# Patient Record
Sex: MALE | Race: WHITE | NOT HISPANIC OR LATINO | Employment: FULL TIME | URBAN - METROPOLITAN AREA
[De-identification: names, ages, dates, MRNs, and addresses within clinical notes are randomized per-mention and may not be internally consistent; named-entity substitution may affect disease eponyms.]

---

## 2018-07-18 ENCOUNTER — HOSPITAL ENCOUNTER (OUTPATIENT)
Dept: GENERAL RADIOLOGY | Facility: HOSPITAL | Age: 48
Discharge: HOME | End: 2018-07-18
Attending: FAMILY MEDICINE | Admitting: FAMILY MEDICINE

## 2019-06-28 DIAGNOSIS — Z00.00 ENCOUNTER FOR ANNUAL PHYSICAL EXAM: Primary | ICD-10-CM

## 2019-06-29 ENCOUNTER — CONVERSION ENCOUNTER (OUTPATIENT)
Dept: FAMILY MEDICINE CLINIC | Facility: CLINIC | Age: 49
End: 2019-06-29

## 2019-06-29 LAB
ALBUMIN SERPL-MCNC: 4.4 G/DL (ref 3.6–5.1)
ALP SERPL-CCNC: 78 U/L (ref 40–115)
ALT SERPL-CCNC: 27 U/L (ref 9–46)
AST SERPL-CCNC: 20 U/L (ref 10–40)
BASOPHILS # BLD AUTO: 78 CELLS/UL (ref 0–200)
BASOPHILS NFR BLD AUTO: 1.2 %
BILIRUB SERPL-MCNC: 0.8 MG/DL (ref 0.2–1.2)
BUN SERPL-MCNC: 15 MG/DL (ref 7–25)
BUN/CREAT SERPL: ABNORMAL (CALC) (ref 6–22)
CALCIUM SERPL-MCNC: 9.7 MG/DL (ref 8.6–10.3)
CHLORIDE SERPL-SCNC: 104 MMOL/L (ref 98–110)
CONV CO2: 27 MMOL/L (ref 20–32)
CONV TOTAL PROTEIN: 7.1 G/DL (ref 6.1–8.1)
CREAT UR-MCNC: 1.06 MG/DL (ref 0.6–1.35)
EOSINOPHIL # BLD AUTO: 169 CELLS/UL (ref 15–500)
EOSINOPHIL # BLD AUTO: 2.6 %
ERYTHROCYTE [DISTWIDTH] IN BLOOD BY AUTOMATED COUNT: 13.3 % (ref 11–15)
GLOBULIN UR ELPH-MCNC: 2.7 MG/DL (ref 1.9–3.7)
GLUCOSE UR QL: 139 MG/DL (ref 65–99)
HCT VFR BLD AUTO: 45.4 % (ref 38.5–50)
HGB BLD-MCNC: 15.1 G/DL (ref 13.2–17.1)
INSULIN SERPL-ACNC: 1.6 (CALC) (ref 1–2.5)
LYMPHOCYTES # BLD AUTO: 1456 CELLS/UL (ref 850–3900)
LYMPHOCYTES NFR BLD AUTO: 22.4 %
MCH RBC QN AUTO: 28.1 PG (ref 27–33)
MCHC RBC AUTO-ENTMCNC: 33.3 G/DL (ref 32–36)
MCV RBC AUTO: 84.5 FL (ref 80–100)
MONOCYTES # BLD AUTO: 546 CELLS/UL (ref 200–950)
MONOCYTES NFR BLD AUTO: 8.4 %
NEUTROPHILS # BLD AUTO: 4251 CELLS/UL (ref 1500–7800)
NEUTROPHILS NFR BLD AUTO: 65.4 %
PLATELET # BLD AUTO: 245 10*3/UL (ref 140–400)
PMV BLD AUTO: 10 FL (ref 7.5–12.5)
POTASSIUM SERPL-SCNC: 4.5 MMOL/L (ref 3.5–5.3)
RBC # BLD AUTO: 5.37 MILLION/UL (ref 4.2–5.8)
SODIUM SERPL-SCNC: 140 MMOL/L (ref 135–146)
T3RU NFR SERPL: 29 % (ref 22–35)
T4 FREE SERPL-MCNC: 1 NG/DL (ref 0.8–1.8)
TSH SERPL-ACNC: 2.61 MIU/L (ref 0.4–4.5)
WBC # BLD AUTO: 6.5 10*3/UL (ref 3.8–10.8)

## 2019-07-09 ENCOUNTER — OFFICE VISIT (OUTPATIENT)
Dept: FAMILY MEDICINE CLINIC | Facility: CLINIC | Age: 49
End: 2019-07-09

## 2019-07-09 VITALS
TEMPERATURE: 98.2 F | DIASTOLIC BLOOD PRESSURE: 76 MMHG | SYSTOLIC BLOOD PRESSURE: 128 MMHG | HEIGHT: 67 IN | RESPIRATION RATE: 16 BRPM | WEIGHT: 241.8 LBS | OXYGEN SATURATION: 97 % | BODY MASS INDEX: 37.95 KG/M2 | HEART RATE: 92 BPM

## 2019-07-09 DIAGNOSIS — E78.2 MIXED HYPERLIPIDEMIA: ICD-10-CM

## 2019-07-09 DIAGNOSIS — I10 ESSENTIAL HYPERTENSION: ICD-10-CM

## 2019-07-09 DIAGNOSIS — Z12.5 SCREENING PSA (PROSTATE SPECIFIC ANTIGEN): ICD-10-CM

## 2019-07-09 DIAGNOSIS — R22.1 MASS IN NECK: Primary | ICD-10-CM

## 2019-07-09 PROBLEM — M10.072 ACUTE IDIOPATHIC GOUT OF LEFT ANKLE: Status: ACTIVE | Noted: 2019-07-09

## 2019-07-09 PROBLEM — IMO0001 WHITE COAT HYPERTENSION: Status: ACTIVE | Noted: 2019-07-09

## 2019-07-09 PROBLEM — M13.0 POLYARTHROPATHY OR POLYARTHRITIS: Status: ACTIVE | Noted: 2019-07-09

## 2019-07-09 PROBLEM — J30.9 ALLERGIC RHINITIS: Status: ACTIVE | Noted: 2019-07-09

## 2019-07-09 PROBLEM — M51.24 THORACIC DISC HERNIATION: Status: ACTIVE | Noted: 2019-07-09

## 2019-07-09 PROBLEM — M53.3 SI (SACROILIAC) JOINT DYSFUNCTION: Status: ACTIVE | Noted: 2019-07-09

## 2019-07-09 PROCEDURE — 99214 OFFICE O/P EST MOD 30 MIN: CPT | Performed by: FAMILY MEDICINE

## 2019-07-09 RX ORDER — IBUPROFEN 200 MG
TABLET ORAL AS NEEDED
COMMUNITY
End: 2020-02-06

## 2019-07-09 RX ORDER — COLCHICINE 0.6 MG/1
0.6 TABLET ORAL AS NEEDED
COMMUNITY
Start: 2017-12-15

## 2019-07-09 NOTE — PROGRESS NOTES
Rooming Tab(CC,VS,Pt Hx,Fall Screen)  Chief Complaint   Patient presents with   • Cyst   • Plantar Fasciitis       Subjective   Bob Rebollar is a 48 y.o. male.     Cyst   This is a new problem. The current episode started more than 1 month ago. The problem occurs constantly. The problem has been gradually worsening. Associated symptoms include fatigue. Pertinent negatives include no abdominal pain, arthralgias, chest pain, coughing, fever, nausea, rash, sore throat, swollen glands or vomiting. Nothing aggravates the symptoms. He has tried nothing for the symptoms.   Hypertension   This is a chronic problem. The current episode started more than 1 year ago. The problem is unchanged. The problem is controlled. Pertinent negatives include no chest pain or shortness of breath. There are no associated agents to hypertension. There are no known risk factors for coronary artery disease. Past treatments include nothing. Current antihypertension treatment includes nothing. There is no history of angina, kidney disease, CAD/MI, CVA, heart failure or left ventricular hypertrophy. There is no history of chronic renal disease.        The following portions of the patient's history were reviewed and updated as appropriate: allergies, current medications, past family history, past medical history, past social history, past surgical history and problem list.    Patient Care Team:  Anisha Carmona MD as PCP - General    Problem List Tab  Medications Tab  Synopsis Tab  Chart Review Tab  Care Everywhere Tab  Immunizations Tab  Patient History Tab    Social History     Tobacco Use   • Smoking status: Former Smoker     Last attempt to quit:      Years since quittin.5   Substance Use Topics   • Alcohol use: No     Frequency: Never       Review of Systems   Constitutional: Positive for fatigue. Negative for activity change and fever.   HENT: Negative for ear pain, rhinorrhea, sinus pressure, sore throat and voice change.   "  Eyes: Negative for visual disturbance.   Respiratory: Negative for cough and shortness of breath.    Cardiovascular: Negative for chest pain.   Gastrointestinal: Negative for abdominal pain, diarrhea, nausea and vomiting.   Endocrine: Negative for cold intolerance and heat intolerance.   Genitourinary: Negative for frequency and urgency.   Musculoskeletal: Negative for arthralgias.   Skin: Negative for rash.   Neurological: Negative for syncope.   Hematological: Does not bruise/bleed easily.   Psychiatric/Behavioral: Negative for depressed mood. The patient is not nervous/anxious.        Objective     Rooming Tab(CC,VS,Pt Hx,Fall Screen)  /76   Pulse 92   Temp 98.2 °F (36.8 °C)   Resp 16   Ht 170.2 cm (67\")   Wt 110 kg (241 lb 12.8 oz)   SpO2 97%   BMI 37.87 kg/m²     Body mass index is 37.87 kg/m².    Physical Exam   Constitutional: He is oriented to person, place, and time. He appears well-developed and well-nourished. He is cooperative.   Cardiovascular: Normal rate, regular rhythm and normal heart sounds. Exam reveals no gallop and no friction rub.   No murmur heard.  Pulmonary/Chest: Effort normal and breath sounds normal. He has no wheezes. He has no rales.   Neurological: He is alert and oriented to person, place, and time. Coordination normal.   Skin: Skin is warm and dry.   Psychiatric: He has a normal mood and affect.   Vitals reviewed.       Statin Choice Calculator  Data Reviewed:               Lab Results   Component Value Date    CHLPL 214 (H) 07/09/2019    TRIG 245 (H) 07/09/2019    HDL 44 07/09/2019    VLDL 49 (H) 07/09/2019     (H) 07/09/2019    PSA 0.9 07/09/2019      Assessment/Plan   Order Review Tab  Health Maintenance Tab  Patient Plan/Order Tab    Problem List Items Addressed This Visit        Cardiovascular and Mediastinum    Essential hypertension    Overview     White coat HTN type           Other Visit Diagnoses     Mass in neck    -  Primary    send to ENT    " Relevant Orders    CT Soft Tissue Neck With & Without Contrast    Mixed hyperlipidemia        Relevant Orders    LP+Non-HDL Cholesterol ([facility]) (Completed)    Screening PSA (prostate specific antigen)        Relevant Orders    PSA Screen (Completed)          Wrapup Tab  No Follow-up on file.

## 2019-07-10 LAB
CHOLEST SERPL-MCNC: 214 MG/DL (ref 100–199)
HDLC SERPL-MCNC: 44 MG/DL
LDLC SERPL CALC-MCNC: 121 MG/DL (ref 0–99)
NONHDLC SERPL-MCNC: 170 MG/DL (ref 0–129)
PSA SERPL-MCNC: 0.9 NG/ML (ref 0–4)
TRIGL SERPL-MCNC: 245 MG/DL (ref 0–149)
VLDLC SERPL CALC-MCNC: 49 MG/DL (ref 5–40)

## 2019-07-11 PROBLEM — I10 ESSENTIAL HYPERTENSION: Status: ACTIVE | Noted: 2019-07-09

## 2019-07-16 DIAGNOSIS — E78.2 MIXED HYPERLIPIDEMIA: Primary | ICD-10-CM

## 2019-07-16 RX ORDER — ROSUVASTATIN CALCIUM 10 MG/1
10 TABLET, COATED ORAL DAILY
Qty: 90 TABLET | Refills: 6 | Status: SHIPPED | OUTPATIENT
Start: 2019-07-16 | End: 2020-02-06

## 2019-07-24 ENCOUNTER — HOSPITAL ENCOUNTER (OUTPATIENT)
Dept: CT IMAGING | Facility: HOSPITAL | Age: 49
Discharge: HOME | End: 2019-07-24
Admitting: FAMILY MEDICINE

## 2019-07-24 DIAGNOSIS — R22.1 MASS IN NECK: ICD-10-CM

## 2019-07-24 PROCEDURE — 0 IOPAMIDOL PER 1 ML: Performed by: FAMILY MEDICINE

## 2019-07-24 PROCEDURE — 70492 CT SFT TSUE NCK W/O & W/DYE: CPT

## 2019-07-24 RX ADMIN — IOPAMIDOL 100 ML: 755 INJECTION, SOLUTION INTRAVENOUS at 16:30

## 2019-07-26 ENCOUNTER — TELEPHONE (OUTPATIENT)
Dept: FAMILY MEDICINE CLINIC | Facility: CLINIC | Age: 49
End: 2019-07-26

## 2019-08-05 ENCOUNTER — TELEPHONE (OUTPATIENT)
Dept: FAMILY MEDICINE CLINIC | Facility: CLINIC | Age: 49
End: 2019-08-05

## 2019-08-05 NOTE — TELEPHONE ENCOUNTER
----- Message from Anisha Carmona MD sent at 7/25/2019  8:26 AM EDT -----  His ct is ok except for a little calcium near the parotid gland. I believe that means inflammation of the gland. He can see ENT to get this looked at to make sure it is nothing else

## 2020-01-17 ENCOUNTER — OFFICE VISIT (OUTPATIENT)
Dept: FAMILY MEDICINE CLINIC | Facility: CLINIC | Age: 50
End: 2020-01-17

## 2020-01-17 VITALS
SYSTOLIC BLOOD PRESSURE: 136 MMHG | BODY MASS INDEX: 38.14 KG/M2 | DIASTOLIC BLOOD PRESSURE: 82 MMHG | RESPIRATION RATE: 18 BRPM | WEIGHT: 243 LBS | TEMPERATURE: 98.4 F | HEART RATE: 108 BPM | OXYGEN SATURATION: 96 % | HEIGHT: 67 IN

## 2020-01-17 DIAGNOSIS — J01.00 ACUTE NON-RECURRENT MAXILLARY SINUSITIS: Primary | ICD-10-CM

## 2020-01-17 PROCEDURE — 99213 OFFICE O/P EST LOW 20 MIN: CPT | Performed by: FAMILY MEDICINE

## 2020-01-17 RX ORDER — CEFPROZIL 500 MG/1
500 TABLET, FILM COATED ORAL 2 TIMES DAILY
Qty: 28 TABLET | Refills: 1 | Status: SHIPPED | OUTPATIENT
Start: 2020-01-17 | End: 2020-02-06

## 2020-01-17 NOTE — PROGRESS NOTES
Subjective   Bob Rebollar is a 49 y.o. male.     Sx ongoing x 1 month    Cough   Episode onset: 1 months. The problem has been waxing and waning. The problem occurs every few minutes. The cough is productive of sputum. Associated symptoms include nasal congestion, postnasal drip, a sore throat (scratchy) and wheezing. Pertinent negatives include no chest pain, ear pain, fever, rash or shortness of breath. Associated symptoms comments: Sinus pressure.        The following portions of the patient's history were reviewed and updated as appropriate: allergies, current medications, past family history, past medical history, past social history, past surgical history and problem list.    Patient Active Problem List   Diagnosis   • Acute idiopathic gout of left ankle   • Allergic rhinitis   • History of tobacco use   • Polyarthropathy or polyarthritis   • Thoracic disc herniation   • Essential hypertension   • SI (sacroiliac) joint dysfunction       Current Outpatient Medications on File Prior to Visit   Medication Sig Dispense Refill   • colchicine (COLCRYS) 0.6 MG tablet Take  by mouth As Needed.     • ibuprofen (ADVIL,MOTRIN) 200 MG tablet Take  by mouth As Needed.     • Menthol-Methyl Salicylate (BENGAY ARTHRITIS FORMULA EX) As Needed.     • rosuvastatin (CRESTOR) 10 MG tablet Take 1 tablet by mouth Daily. 90 tablet 6   • [DISCONTINUED] Carboxymeth-Glycerin-Polysorb (REFRESH OPTIVE ADVANCED) 0.5-1-0.5 % solution Apply  to eye(s) as directed by provider As Needed.     • [DISCONTINUED] Carboxymethylcellulose Sodium (CELLUVISC OP) Apply  to eye(s) as directed by provider Daily.       No current facility-administered medications on file prior to visit.        No Known Allergies    Review of Systems   Constitutional: Negative for activity change, appetite change, fatigue and fever.   HENT: Positive for postnasal drip and sore throat (scratchy). Negative for ear pain, swollen glands and voice change.    Eyes: Negative for  visual disturbance.   Respiratory: Positive for cough and wheezing. Negative for shortness of breath.    Cardiovascular: Negative for chest pain and leg swelling.   Gastrointestinal: Negative for abdominal pain, blood in stool, constipation, diarrhea, nausea and vomiting.   Endocrine: Negative for polydipsia and polyuria.   Genitourinary: Negative for dysuria, frequency and hematuria.   Musculoskeletal: Negative for joint swelling, neck pain and neck stiffness.   Skin: Negative for rash and bruise.   Neurological: Negative for weakness, numbness and headache.   Psychiatric/Behavioral: Negative for suicidal ideas and depressed mood.     I have reviewed and confirmed the accuracy of the ROS as documented by the MA/LPN/RN Dillan Mitchell MD      Objective   Vitals:    01/17/20 1615   BP: 136/82   Pulse: 108   Resp: 18   Temp: 98.4 °F (36.9 °C)   SpO2: 96%     Physical Exam   Constitutional: He is oriented to person, place, and time. He appears well-developed and well-nourished.   Eyes: Pupils are equal, round, and reactive to light. Conjunctivae and EOM are normal.   Neck: Normal range of motion. Neck supple.   Cardiovascular: Normal rate, regular rhythm and normal heart sounds.   Pulmonary/Chest: Effort normal and breath sounds normal.   Abdominal: Soft. Bowel sounds are normal.   Musculoskeletal: Normal range of motion.   Neurological: He is alert and oriented to person, place, and time.   Skin: Skin is warm and dry.   Psychiatric: He has a normal mood and affect. His behavior is normal. Judgment and thought content normal.         Assessment/Plan .  Problem List Items Addressed This Visit     None      Visit Diagnoses     Acute non-recurrent maxillary sinusitis    -  Primary    Relevant Medications    cefprozil (CEFZIL) 500 MG tablet      OTC meds discussed. Call next week if sx persist. May need steroid pack if sx persist.  Follow-up in 2 weeks if not better.  Follow-up sooner for worsening symptoms or for  any concerns.    Medication and medication adverse effects discussed.  Drug education given and explained to patient. Patient verbalized understanding.

## 2020-01-24 ENCOUNTER — TELEPHONE (OUTPATIENT)
Dept: FAMILY MEDICINE CLINIC | Facility: CLINIC | Age: 50
End: 2020-01-24

## 2020-01-24 DIAGNOSIS — J01.00 ACUTE NON-RECURRENT MAXILLARY SINUSITIS: Primary | ICD-10-CM

## 2020-01-24 RX ORDER — METHYLPREDNISOLONE 4 MG/1
TABLET ORAL
Qty: 21 TABLET | Refills: 0 | Status: SHIPPED | OUTPATIENT
Start: 2020-01-24 | End: 2020-02-06

## 2020-01-24 RX ORDER — ALBUTEROL SULFATE 90 UG/1
2 AEROSOL, METERED RESPIRATORY (INHALATION) EVERY 4 HOURS PRN
Qty: 1 INHALER | Refills: 0 | Status: SHIPPED | OUTPATIENT
Start: 2020-01-24 | End: 2023-03-29 | Stop reason: SDUPTHER

## 2020-01-24 NOTE — TELEPHONE ENCOUNTER
Pt called.  Was seen on 1-17 and given cefzil. Was ayden d to call back if he didn't get better.  He is not any better.  Still a lot of coughing and wheezing.  He is requesting an inhaker and steroid.

## 2020-02-04 ENCOUNTER — TELEPHONE (OUTPATIENT)
Dept: FAMILY MEDICINE CLINIC | Facility: CLINIC | Age: 50
End: 2020-02-04

## 2020-02-06 ENCOUNTER — OFFICE VISIT (OUTPATIENT)
Dept: FAMILY MEDICINE CLINIC | Facility: CLINIC | Age: 50
End: 2020-02-06

## 2020-02-06 VITALS
RESPIRATION RATE: 18 BRPM | OXYGEN SATURATION: 98 % | DIASTOLIC BLOOD PRESSURE: 92 MMHG | BODY MASS INDEX: 37.73 KG/M2 | HEIGHT: 67 IN | HEART RATE: 105 BPM | WEIGHT: 240.4 LBS | SYSTOLIC BLOOD PRESSURE: 146 MMHG | TEMPERATURE: 98.2 F

## 2020-02-06 DIAGNOSIS — M94.0 COSTOCHONDRITIS: ICD-10-CM

## 2020-02-06 DIAGNOSIS — J45.41 MODERATE PERSISTENT REACTIVE AIRWAY DISEASE WITH ACUTE EXACERBATION: Primary | ICD-10-CM

## 2020-02-06 DIAGNOSIS — K21.9 GASTROESOPHAGEAL REFLUX DISEASE WITHOUT ESOPHAGITIS: ICD-10-CM

## 2020-02-06 PROCEDURE — 99214 OFFICE O/P EST MOD 30 MIN: CPT | Performed by: FAMILY MEDICINE

## 2020-02-06 RX ORDER — BUDESONIDE AND FORMOTEROL FUMARATE DIHYDRATE 160; 4.5 UG/1; UG/1
2 AEROSOL RESPIRATORY (INHALATION)
Qty: 1 INHALER | Refills: 0 | Status: SHIPPED | OUTPATIENT
Start: 2020-02-06 | End: 2021-10-04

## 2020-02-06 RX ORDER — MONTELUKAST SODIUM 10 MG/1
10 TABLET ORAL NIGHTLY
Qty: 30 TABLET | Refills: 12 | Status: SHIPPED | OUTPATIENT
Start: 2020-02-06 | End: 2021-10-04

## 2020-02-06 RX ORDER — CYCLOBENZAPRINE HCL 10 MG
1 TABLET ORAL EVERY 8 HOURS PRN
COMMUNITY
Start: 2020-02-04 | End: 2021-04-13

## 2020-02-06 RX ORDER — ALBUTEROL SULFATE 2.5 MG/3ML
2.5 SOLUTION RESPIRATORY (INHALATION) EVERY 4 HOURS PRN
Qty: 90 VIAL | Refills: 0 | Status: SHIPPED | OUTPATIENT
Start: 2020-02-06

## 2020-06-12 ENCOUNTER — TELEPHONE (OUTPATIENT)
Dept: FAMILY MEDICINE CLINIC | Facility: CLINIC | Age: 50
End: 2020-06-12

## 2020-07-06 ENCOUNTER — OFFICE VISIT (OUTPATIENT)
Dept: FAMILY MEDICINE CLINIC | Facility: CLINIC | Age: 50
End: 2020-07-06

## 2020-07-06 VITALS
BODY MASS INDEX: 37.2 KG/M2 | WEIGHT: 237 LBS | TEMPERATURE: 97.9 F | SYSTOLIC BLOOD PRESSURE: 142 MMHG | HEART RATE: 88 BPM | HEIGHT: 67 IN | RESPIRATION RATE: 16 BRPM | DIASTOLIC BLOOD PRESSURE: 84 MMHG | OXYGEN SATURATION: 99 %

## 2020-07-06 DIAGNOSIS — T14.8XXA ABRASION: ICD-10-CM

## 2020-07-06 DIAGNOSIS — Z23 NEED FOR TDAP VACCINATION: Primary | ICD-10-CM

## 2020-07-06 PROCEDURE — 90471 IMMUNIZATION ADMIN: CPT | Performed by: FAMILY MEDICINE

## 2020-07-06 PROCEDURE — 90715 TDAP VACCINE 7 YRS/> IM: CPT | Performed by: FAMILY MEDICINE

## 2020-07-06 PROCEDURE — 99213 OFFICE O/P EST LOW 20 MIN: CPT | Performed by: FAMILY MEDICINE

## 2020-07-06 RX ORDER — ROSUVASTATIN CALCIUM 10 MG/1
10 TABLET, COATED ORAL DAILY
COMMUNITY
Start: 2020-05-21 | End: 2021-05-26

## 2020-07-06 NOTE — PROGRESS NOTES
Subjective   Bob Rebollar is a 49 y.o. male.     Chief Complaint   Patient presents with   • Wound Check       Wound Check   He was originally treated more than 14 days ago. His temperature was unmeasured prior to arrival. The temperature was taken using an oral thermometer. There has been no drainage from the wound. There is no redness present. There is no swelling present. There is no pain present.        The following portions of the patient's history were reviewed and updated as appropriate: allergies, current medications, past family history, past medical history, past social history, past surgical history and problem list.    Allergies:  No Known Allergies    Social History:  Social History     Socioeconomic History   • Marital status:      Spouse name: Not on file   • Number of children: Not on file   • Years of education: Not on file   • Highest education level: Not on file   Tobacco Use   • Smoking status: Former Smoker     Packs/day: 1.00     Years: 8.00     Pack years: 8.00     Types: Cigarettes     Last attempt to quit:      Years since quittin.5   • Smokeless tobacco: Never Used   Substance and Sexual Activity   • Alcohol use: No     Frequency: Never   • Drug use: Never   • Sexual activity: Defer       Family History:  Family History   Problem Relation Age of Onset   • Diabetes Mother         diabetes mellitus   • Heart disease Mother         cardiovascular disease, ischemic   • Diabetes Brother         diabetes mellitus       Past Medical History :  Patient Active Problem List   Diagnosis   • Acute idiopathic gout of left ankle   • Allergic rhinitis   • History of tobacco use   • Polyarthropathy or polyarthritis   • Thoracic disc herniation   • Essential hypertension   • SI (sacroiliac) joint dysfunction   • Gastroesophageal reflux disease without esophagitis   • Abrasion       Medication List:    Current Outpatient Medications:   •  albuterol (PROVENTIL) (2.5 MG/3ML) 0.083% nebulizer  solution, Take 2.5 mg by nebulization Every 4 (Four) Hours As Needed for Wheezing., Disp: 90 vial, Rfl: 0  •  albuterol sulfate  (90 Base) MCG/ACT inhaler, Inhale 2 puffs Every 4 (Four) Hours As Needed for Wheezing., Disp: 1 inhaler, Rfl: 0  •  colchicine (COLCRYS) 0.6 MG tablet, Take 0.6 mg by mouth As Needed., Disp: , Rfl:   •  cyclobenzaprine (FLEXERIL) 10 MG tablet, Take 1 tablet by mouth Every 8 (Eight) Hours As Needed., Disp: , Rfl:   •  diclofenac (VOLTAREN) 50 MG EC tablet, Take 1 tablet by mouth 2 (Two) Times a Day., Disp: 60 tablet, Rfl: 12  •  ipratropium (ATROVENT) 0.02 % nebulizer solution, Take 2.5 mL by nebulization Every 8 (Eight) Hours., Disp: 100 mL, Rfl: 12  •  Menthol-Methyl Salicylate (BENGAY ARTHRITIS FORMULA EX), As Needed., Disp: , Rfl:   •  montelukast (SINGULAIR) 10 MG tablet, Take 1 tablet by mouth Every Night., Disp: 30 tablet, Rfl: 12  •  budesonide-formoterol (SYMBICORT) 160-4.5 MCG/ACT inhaler, Inhale 2 puffs 2 (Two) Times a Day., Disp: 1 inhaler, Rfl: 0  •  rosuvastatin (CRESTOR) 10 MG tablet, Take 10 mg by mouth Daily., Disp: , Rfl:     Past Surgical History:  History reviewed. No pertinent surgical history.    Review of Systems:  Review of Systems   Constitutional: Negative for activity change and fever.   HENT: Negative for ear pain, rhinorrhea, sinus pressure and voice change.    Eyes: Negative for visual disturbance.   Respiratory: Negative for cough and shortness of breath.    Cardiovascular: Negative for chest pain.   Gastrointestinal: Negative for abdominal pain, diarrhea, nausea and vomiting.   Endocrine: Negative for cold intolerance and heat intolerance.   Genitourinary: Negative for frequency and urgency.   Musculoskeletal: Negative for arthralgias.   Skin: Negative for rash.   Neurological: Negative for syncope.   Hematological: Does not bruise/bleed easily.   Psychiatric/Behavioral: Negative for depressed mood. The patient is not nervous/anxious.        Physical  "Exam:  Vital Signs:  Visit Vitals  /84 (BP Location: Left arm)   Pulse 88   Temp 97.9 °F (36.6 °C) (Oral)   Resp 16   Ht 170.2 cm (67\")   Wt 108 kg (237 lb)   SpO2 99%   BMI 37.12 kg/m²       Physical Exam   Constitutional: He is oriented to person, place, and time. He appears well-developed and well-nourished. He is cooperative.   Cardiovascular: Normal rate, regular rhythm and normal heart sounds. Exam reveals no gallop and no friction rub.   No murmur heard.  Pulmonary/Chest: Effort normal and breath sounds normal. He has no wheezes. He has no rales.   Neurological: He is alert and oriented to person, place, and time. Coordination normal.   Skin: Skin is warm and dry.   Left wrist with abrasion, healing, no erythema, tenderness or drainage   Psychiatric: He has a normal mood and affect.   Vitals reviewed.      Assessment and Plan:  Problem List Items Addressed This Visit        Other    Abrasion    Overview     Left wrist  Looks like it is healing  TDAP given today           Other Visit Diagnoses     Need for Tdap vaccination    -  Primary    Relevant Orders    Tdap Vaccine Greater Than or Equal To 8yo IM (Completed)          An After Visit Summary and PPPS were given to the patient.         I wore protective equipment throughout this patient encounter to include mask and gloves. Hand hygiene was performed before donning protective equipment and after removal when leaving the room.   "

## 2020-10-15 ENCOUNTER — OFFICE VISIT (OUTPATIENT)
Dept: FAMILY MEDICINE CLINIC | Facility: CLINIC | Age: 50
End: 2020-10-15

## 2020-10-15 VITALS
BODY MASS INDEX: 35.79 KG/M2 | OXYGEN SATURATION: 93 % | RESPIRATION RATE: 18 BRPM | TEMPERATURE: 98.5 F | SYSTOLIC BLOOD PRESSURE: 148 MMHG | HEIGHT: 67 IN | HEART RATE: 90 BPM | WEIGHT: 228 LBS | DIASTOLIC BLOOD PRESSURE: 105 MMHG

## 2020-10-15 DIAGNOSIS — I10 ESSENTIAL HYPERTENSION: Primary | ICD-10-CM

## 2020-10-15 DIAGNOSIS — H65.01 NON-RECURRENT ACUTE SEROUS OTITIS MEDIA OF RIGHT EAR: ICD-10-CM

## 2020-10-15 DIAGNOSIS — J01.00 ACUTE NON-RECURRENT MAXILLARY SINUSITIS: ICD-10-CM

## 2020-10-15 PROCEDURE — 99214 OFFICE O/P EST MOD 30 MIN: CPT | Performed by: FAMILY MEDICINE

## 2020-10-15 RX ORDER — CEPHALEXIN 500 MG/1
500 CAPSULE ORAL 3 TIMES DAILY
Qty: 30 CAPSULE | Refills: 0 | Status: SHIPPED | OUTPATIENT
Start: 2020-10-15 | End: 2021-04-13

## 2020-10-15 RX ORDER — LISINOPRIL 10 MG/1
10 TABLET ORAL DAILY
Qty: 30 TABLET | Refills: 12 | Status: SHIPPED | OUTPATIENT
Start: 2020-10-15 | End: 2021-04-13

## 2020-10-15 NOTE — PROGRESS NOTES
Subjective   Bob Rebollar is a 50 y.o. male.     Chief Complaint   Patient presents with   • Cough   • URI       URI   This is a new problem. The current episode started in the past 7 days. The problem has been gradually worsening. The maximum temperature recorded prior to his arrival was 100.4 - 100.9 F. The fever has been present for 1 to 2 days. Associated symptoms include congestion, coughing, headaches, a plugged ear sensation and sinus pain. Pertinent negatives include no abdominal pain, chest pain, diarrhea, ear pain, nausea, rash, rhinorrhea, sneezing, sore throat, swollen glands, vomiting or wheezing. Associated symptoms comments: Body aches  . Treatments tried: ibuprofen, antibiotic flexeril, muscle relaxers tiazadan. alkaselterz. The treatment provided no relief.        The following portions of the patient's history were reviewed and updated as appropriate: allergies, current medications, past family history, past medical history, past social history, past surgical history and problem list.    Allergies:  No Known Allergies    Social History:  Social History     Socioeconomic History   • Marital status:      Spouse name: Not on file   • Number of children: Not on file   • Years of education: Not on file   • Highest education level: Not on file   Tobacco Use   • Smoking status: Former Smoker     Packs/day: 1.00     Years: 8.00     Pack years: 8.00     Types: Cigarettes     Quit date:      Years since quittin.8   • Smokeless tobacco: Never Used   Substance and Sexual Activity   • Alcohol use: No     Frequency: Never   • Drug use: Never   • Sexual activity: Defer       Family History:  Family History   Problem Relation Age of Onset   • Diabetes Mother         diabetes mellitus   • Heart disease Mother         cardiovascular disease, ischemic   • Diabetes Brother         diabetes mellitus       Past Medical History :  Patient Active Problem List   Diagnosis   • Acute idiopathic gout of left  ankle   • Allergic rhinitis   • History of tobacco use   • Polyarthropathy or polyarthritis   • Thoracic disc herniation   • Essential hypertension   • SI (sacroiliac) joint dysfunction   • Gastroesophageal reflux disease without esophagitis   • Abrasion   • Non-recurrent acute serous otitis media of right ear   • Acute non-recurrent maxillary sinusitis       Medication List:    Current Outpatient Medications:   •  albuterol (PROVENTIL) (2.5 MG/3ML) 0.083% nebulizer solution, Take 2.5 mg by nebulization Every 4 (Four) Hours As Needed for Wheezing., Disp: 90 vial, Rfl: 0  •  albuterol sulfate  (90 Base) MCG/ACT inhaler, Inhale 2 puffs Every 4 (Four) Hours As Needed for Wheezing., Disp: 1 inhaler, Rfl: 0  •  budesonide-formoterol (SYMBICORT) 160-4.5 MCG/ACT inhaler, Inhale 2 puffs 2 (Two) Times a Day., Disp: 1 inhaler, Rfl: 0  •  colchicine (COLCRYS) 0.6 MG tablet, Take 0.6 mg by mouth As Needed., Disp: , Rfl:   •  cyclobenzaprine (FLEXERIL) 10 MG tablet, Take 1 tablet by mouth Every 8 (Eight) Hours As Needed., Disp: , Rfl:   •  ipratropium (ATROVENT) 0.02 % nebulizer solution, Take 2.5 mL by nebulization Every 8 (Eight) Hours., Disp: 100 mL, Rfl: 12  •  Menthol-Methyl Salicylate (BENGAY ARTHRITIS FORMULA EX), As Needed., Disp: , Rfl:   •  montelukast (SINGULAIR) 10 MG tablet, Take 1 tablet by mouth Every Night., Disp: 30 tablet, Rfl: 12  •  rosuvastatin (CRESTOR) 10 MG tablet, Take 10 mg by mouth Daily., Disp: , Rfl:   •  cephalexin (KEFLEX) 500 MG capsule, Take 1 capsule by mouth 3 (Three) Times a Day., Disp: 30 capsule, Rfl: 0  •  diclofenac (VOLTAREN) 50 MG EC tablet, Take 1 tablet by mouth 2 (Two) Times a Day., Disp: 60 tablet, Rfl: 12  •  lisinopril (PRINIVIL,ZESTRIL) 10 MG tablet, Take 1 tablet by mouth Daily., Disp: 30 tablet, Rfl: 12    Past Surgical History:  No past surgical history on file.    Review of Systems:  Review of Systems   Constitutional: Negative for activity change, chills and fever.  "  HENT: Positive for congestion. Negative for ear pain, postnasal drip, rhinorrhea, sinus pressure, sneezing, sore throat, swollen glands, trouble swallowing and voice change.    Eyes: Negative for pain, redness, itching and visual disturbance.   Respiratory: Positive for cough. Negative for shortness of breath and wheezing.    Cardiovascular: Negative for chest pain.   Gastrointestinal: Negative for abdominal pain, diarrhea, nausea and vomiting.   Endocrine: Negative for cold intolerance and heat intolerance.   Genitourinary: Negative for frequency and urgency.   Musculoskeletal: Negative for arthralgias.   Skin: Negative for rash.   Neurological: Negative for syncope.   Hematological: Does not bruise/bleed easily.   Psychiatric/Behavioral: Negative for depressed mood. The patient is not nervous/anxious.        Physical Exam:  Vital Signs:  Visit Vitals  BP (!) 148/105   Pulse 90   Temp 98.5 °F (36.9 °C)   Resp 18   Ht 170.2 cm (67\")   Wt 103 kg (228 lb)   SpO2 93%   BMI 35.71 kg/m²       Physical Exam  Vitals signs reviewed.   Constitutional:       Appearance: Normal appearance. He is well-developed.   HENT:      Head: Normocephalic and atraumatic.      Right Ear: External ear normal. No middle ear effusion. Tympanic membrane is not injected, erythematous, retracted or bulging.      Left Ear: External ear normal.  No middle ear effusion. Tympanic membrane is not injected, erythematous, retracted or bulging.      Nose: Nose normal. No rhinorrhea.      Mouth/Throat:      Pharynx: No oropharyngeal exudate.   Cardiovascular:      Rate and Rhythm: Normal rate and regular rhythm.      Heart sounds: Normal heart sounds. No murmur. No friction rub. No gallop.    Pulmonary:      Effort: Pulmonary effort is normal. No respiratory distress.      Breath sounds: Normal breath sounds. No wheezing or rales.   Lymphadenopathy:      Cervical: No cervical adenopathy.   Skin:     General: Skin is warm and dry.   Neurological:      " Mental Status: He is alert and oriented to person, place, and time.      Coordination: Coordination normal.      Gait: Gait normal.   Psychiatric:         Behavior: Behavior is cooperative.         Assessment and Plan:  Problem List Items Addressed This Visit        Cardiovascular and Mediastinum    Essential hypertension - Primary    Overview     White coat HTN type         Relevant Medications    lisinopril (PRINIVIL,ZESTRIL) 10 MG tablet       Respiratory    Acute non-recurrent maxillary sinusitis    Relevant Medications    cephalexin (KEFLEX) 500 MG capsule       Nervous and Auditory    Non-recurrent acute serous otitis media of right ear        increase fluids, tylenol for fever, motrin for pain. Humidifier to help with congestion and to sleep at night. Dicussed OTC meds, gargle with warm salt water. If there is recurrent fever, shortness of breath, lethargy, advised to come in to the office or go to the ER.    Start lisinopril. Diagnosis, treatment and and course discussed. Potential side effects discussed. Return if there is worsening or persistence of symptoms. Follow up in 4 weeks      An After Visit Summary and PPPS were given to the patient.             I wore protective equipment throughout this patient encounter to include mask, gloves and eye protection. Hand hygiene was performed before donning protective equipment and after removal when leaving the room.

## 2020-10-26 ENCOUNTER — TELEPHONE (OUTPATIENT)
Dept: FAMILY MEDICINE CLINIC | Facility: CLINIC | Age: 50
End: 2020-10-26

## 2020-10-26 NOTE — TELEPHONE ENCOUNTER
He has been off work due to covid and is supposed to go back to work tomorrow but he is not well enough to go back. Can you fax a work note to [employer] [phone].

## 2021-04-13 ENCOUNTER — OFFICE VISIT (OUTPATIENT)
Dept: FAMILY MEDICINE CLINIC | Facility: CLINIC | Age: 51
End: 2021-04-13

## 2021-04-13 VITALS
SYSTOLIC BLOOD PRESSURE: 126 MMHG | WEIGHT: 228.8 LBS | OXYGEN SATURATION: 98 % | TEMPERATURE: 98 F | HEART RATE: 114 BPM | HEIGHT: 67 IN | DIASTOLIC BLOOD PRESSURE: 82 MMHG | RESPIRATION RATE: 16 BRPM | BODY MASS INDEX: 35.91 KG/M2

## 2021-04-13 DIAGNOSIS — R63.2 POLYPHAGIA: ICD-10-CM

## 2021-04-13 DIAGNOSIS — I10 ESSENTIAL HYPERTENSION: ICD-10-CM

## 2021-04-13 DIAGNOSIS — H53.9 VISION CHANGES: ICD-10-CM

## 2021-04-13 DIAGNOSIS — Z13.220 SCREENING FOR HYPERLIPIDEMIA: ICD-10-CM

## 2021-04-13 DIAGNOSIS — M25.561 ACUTE PAIN OF BOTH KNEES: ICD-10-CM

## 2021-04-13 DIAGNOSIS — M25.562 ACUTE PAIN OF BOTH KNEES: ICD-10-CM

## 2021-04-13 DIAGNOSIS — M25.511 ACUTE PAIN OF RIGHT SHOULDER: ICD-10-CM

## 2021-04-13 DIAGNOSIS — R73.9 HYPERGLYCEMIA: ICD-10-CM

## 2021-04-13 DIAGNOSIS — E11.9 TYPE 2 DIABETES MELLITUS WITHOUT COMPLICATION, WITHOUT LONG-TERM CURRENT USE OF INSULIN (HCC): Primary | ICD-10-CM

## 2021-04-13 DIAGNOSIS — Z12.5 SCREENING PSA (PROSTATE SPECIFIC ANTIGEN): ICD-10-CM

## 2021-04-13 DIAGNOSIS — Z11.59 NEED FOR HEPATITIS C SCREENING TEST: Primary | ICD-10-CM

## 2021-04-13 DIAGNOSIS — Z00.00 ENCOUNTER FOR WELLNESS EXAMINATION: ICD-10-CM

## 2021-04-13 PROBLEM — J01.00 ACUTE NON-RECURRENT MAXILLARY SINUSITIS: Status: RESOLVED | Noted: 2020-10-15 | Resolved: 2021-04-13

## 2021-04-13 PROBLEM — T14.8XXA ABRASION: Status: RESOLVED | Noted: 2020-07-06 | Resolved: 2021-04-13

## 2021-04-13 PROBLEM — H65.01 NON-RECURRENT ACUTE SEROUS OTITIS MEDIA OF RIGHT EAR: Status: RESOLVED | Noted: 2020-10-15 | Resolved: 2021-04-13

## 2021-04-13 LAB
GLUCOSE BLDC GLUCOMTR-MCNC: 253 MG/DL (ref 70–130)
HBA1C MFR BLD: 13 %

## 2021-04-13 PROCEDURE — 99214 OFFICE O/P EST MOD 30 MIN: CPT | Performed by: FAMILY MEDICINE

## 2021-04-13 PROCEDURE — 83036 HEMOGLOBIN GLYCOSYLATED A1C: CPT | Performed by: FAMILY MEDICINE

## 2021-04-13 PROCEDURE — 82947 ASSAY GLUCOSE BLOOD QUANT: CPT | Performed by: FAMILY MEDICINE

## 2021-04-13 RX ORDER — BILBERRY FRUIT 1000 MG
CAPSULE ORAL
COMMUNITY
End: 2021-10-04

## 2021-04-13 RX ORDER — IBUPROFEN 200 MG
200 TABLET ORAL EVERY 6 HOURS PRN
COMMUNITY

## 2021-04-13 RX ORDER — ACETAMINOPHEN 500 MG
500 TABLET ORAL EVERY 6 HOURS PRN
COMMUNITY

## 2021-04-13 RX ORDER — UBIDECARENONE 100 MG
100 CAPSULE ORAL DAILY
COMMUNITY
End: 2021-10-04

## 2021-04-13 RX ORDER — CARBOXYMETHYLCELLULOSE SODIUM 5 MG/ML
SOLUTION/ DROPS OPHTHALMIC 3 TIMES DAILY PRN
COMMUNITY
End: 2021-10-04

## 2021-04-13 RX ORDER — MULTIVIT WITH MINERALS/LUTEIN
250 TABLET ORAL DAILY
COMMUNITY
End: 2021-10-04

## 2021-04-13 RX ORDER — B-COMPLEX WITH VITAMIN C
TABLET ORAL
COMMUNITY
End: 2021-10-04

## 2021-04-13 RX ORDER — ASPIRIN 81 MG/1
81 TABLET, CHEWABLE ORAL DAILY
COMMUNITY
End: 2021-10-04

## 2021-04-13 RX ORDER — POLYETHYLENE GLYCOL 3350 17 G/17G
17 POWDER, FOR SOLUTION ORAL AS NEEDED
COMMUNITY

## 2021-04-13 RX ORDER — CALCIUM CARBONATE 200(500)MG
1 TABLET,CHEWABLE ORAL AS NEEDED
COMMUNITY

## 2021-04-13 RX ORDER — VITAMIN E 200 UNIT
CAPSULE ORAL
COMMUNITY
End: 2022-06-27

## 2021-04-13 NOTE — PROGRESS NOTES
Subjective   Bob Rebollar is a 50 y.o. male.     Chief Complaint   Patient presents with   • Hyperglycemia   • Shoulder Pain   • Eye Problem   • Leg Pain       Hyperglycemia  This is a new problem. Episode onset: 4 months. The problem occurs constantly. The problem has been gradually worsening. Associated symptoms include urinary symptoms. Pertinent negatives include no abdominal pain, arthralgias, chest pain, coughing, fever, nausea, numbness, rash or vomiting. Associated symptoms comments: Excessive thirst, slow wound healing. Nothing aggravates the symptoms. He has tried nothing for the symptoms.   Arm Pain   Incident onset: 8 months. The injury mechanism was repetitive motion. The pain is present in the right shoulder. The quality of the pain is described as stabbing. The pain radiates to the right arm. The pain has been intermittent since the incident. Pertinent negatives include no chest pain, numbness or tingling. The symptoms are aggravated by movement and lifting. He has tried heat, NSAIDs and acetaminophen for the symptoms. The treatment provided no relief.   Eye Problem   Both eyes are affected.This is a new problem. Episode onset: 4 months. The problem occurs constantly. The problem has been unchanged. The injury mechanism was a welding arc exposure. Pertinent negatives include no fever, nausea, tingling or vomiting.   Leg Pain   There was no injury mechanism. The pain is present in the right knee and left knee. The quality of the pain is described as cramping and aching. Pertinent negatives include no numbness or tingling. He reports no foreign bodies present. The symptoms are aggravated by movement. He has tried nothing for the symptoms.      FSBS [id number]    I personally reviewed and updated the patient's allergies, medications, problem list, and past medical, surgical, social, and family history. I have reviewed and confirmed the accuracy of the History of Present Illness and Review of Symptoms as  documented by the MA/LPN/RN. Anisha Carmona MD    Allergies:  No Known Allergies    Social History:  Social History     Socioeconomic History   • Marital status:      Spouse name: Not on file   • Number of children: Not on file   • Years of education: Not on file   • Highest education level: Not on file   Tobacco Use   • Smoking status: Former Smoker     Packs/day: 1.00     Years: 8.00     Pack years: 8.00     Types: Cigarettes     Quit date:      Years since quittin.3   • Smokeless tobacco: Never Used   Substance and Sexual Activity   • Alcohol use: No   • Drug use: Never   • Sexual activity: Defer       Family History:  Family History   Problem Relation Age of Onset   • Diabetes Mother         diabetes mellitus   • Heart disease Mother         cardiovascular disease, ischemic   • Diabetes Brother         diabetes mellitus       Past Medical History :  Patient Active Problem List   Diagnosis   • Acute idiopathic gout of left ankle   • Allergic rhinitis   • History of tobacco use   • Polyarthropathy or polyarthritis   • Thoracic disc herniation   • Essential hypertension   • SI (sacroiliac) joint dysfunction   • Gastroesophageal reflux disease without esophagitis   • Acute pain of both knees   • Acute pain of right shoulder   • Vision changes   • Polyphagia   • Type 2 diabetes mellitus without complication, without long-term current use of insulin (CMS/[facility])       Medication List:    Current Outpatient Medications:   •  acetaminophen (TYLENOL) 500 MG tablet, Take 500 mg by mouth Every 6 (Six) Hours As Needed for Mild Pain ., Disp: , Rfl:   •  albuterol (PROVENTIL) (2.5 MG/3ML) 0.083% nebulizer solution, Take 2.5 mg by nebulization Every 4 (Four) Hours As Needed for Wheezing., Disp: 90 vial, Rfl: 0  •  albuterol sulfate  (90 Base) MCG/ACT inhaler, Inhale 2 puffs Every 4 (Four) Hours As Needed for Wheezing., Disp: 1 inhaler, Rfl: 0  •  aspirin 81 MG chewable tablet, Chew 81 mg Daily., Disp: ,  Rfl:   •  bismuth subsalicylate (PEPTO BISMOL) 262 MG/15ML suspension, Take 15 mL by mouth Every 6 (Six) Hours As Needed for Indigestion., Disp: , Rfl:   •  budesonide-formoterol (SYMBICORT) 160-4.5 MCG/ACT inhaler, Inhale 2 puffs 2 (Two) Times a Day., Disp: 1 inhaler, Rfl: 0  •  calcium carbonate (TUMS) 500 MG chewable tablet, Chew 1 tablet As Needed for Indigestion or Heartburn., Disp: , Rfl:   •  carboxymethylcellulose (REFRESH PLUS) 0.5 % solution, 3 (Three) Times a Day As Needed for Dry Eyes., Disp: , Rfl:   •  coenzyme Q10 100 MG capsule, Take 100 mg by mouth Daily., Disp: , Rfl:   •  colchicine (COLCRYS) 0.6 MG tablet, Take 0.6 mg by mouth As Needed., Disp: , Rfl:   •  diclofenac (VOLTAREN) 50 MG EC tablet, Take 1 tablet by mouth 2 (Two) Times a Day. (Patient taking differently: Take 50 mg by mouth As Needed.), Disp: 60 tablet, Rfl: 12  •  ibuprofen (ADVIL,MOTRIN) 200 MG tablet, Take 200 mg by mouth Every 6 (Six) Hours As Needed for Mild Pain ., Disp: , Rfl:   •  ipratropium (ATROVENT) 0.02 % nebulizer solution, Take 2.5 mL by nebulization Every 8 (Eight) Hours., Disp: 100 mL, Rfl: 12  •  MegaRed Omega-3 Krill Oil 350 MG capsule, Take  by mouth., Disp: , Rfl:   •  Menthol-Methyl Salicylate (BENGAY ARTHRITIS FORMULA EX), As Needed., Disp: , Rfl:   •  montelukast (SINGULAIR) 10 MG tablet, Take 1 tablet by mouth Every Night., Disp: 30 tablet, Rfl: 12  •  polyethylene glycol (MiraLax) 17 g packet, Take 17 g by mouth As Needed., Disp: , Rfl:   •  rosuvastatin (CRESTOR) 10 MG tablet, Take 10 mg by mouth Daily., Disp: , Rfl:   •  Saw Palmetto 1000 MG capsule, Take  by mouth., Disp: , Rfl:   •  vitamin C (ASCORBIC ACID) 250 MG tablet, Take 250 mg by mouth Daily., Disp: , Rfl:   •  vitamin D3 125 MCG (5000 UT) capsule capsule, Take 5,000 Units by mouth Daily., Disp: , Rfl:   •  Zinc 100 MG tablet, Take  by mouth., Disp: , Rfl:   •  metFORMIN (GLUCOPHAGE) 500 MG tablet, Take 1 tablet by mouth 2 (Two) Times a Day With  "Meals., Disp: 60 tablet, Rfl: 12    Past Surgical History:  History reviewed. No pertinent surgical history.    Review of Systems:  Review of Systems   Constitutional: Negative for activity change and fever.   HENT: Negative for ear pain, rhinorrhea, sinus pressure and voice change.    Eyes: Negative for visual disturbance.   Respiratory: Negative for cough and shortness of breath.    Cardiovascular: Negative for chest pain.   Gastrointestinal: Negative for abdominal pain, diarrhea, nausea and vomiting.   Endocrine: Negative for cold intolerance and heat intolerance.   Genitourinary: Negative for frequency and urgency.   Musculoskeletal: Negative for arthralgias.   Skin: Negative for rash.   Neurological: Negative for tingling, syncope and numbness.   Hematological: Does not bruise/bleed easily.   Psychiatric/Behavioral: Negative for depressed mood. The patient is not nervous/anxious.        Physical Exam:  Vital Signs:  Vital Signs:   /82   Pulse 114   Temp 98 °F (36.7 °C)   Resp 16   Ht 170.2 cm (67\")   Wt 104 kg (228 lb 12.8 oz)   SpO2 98%   BMI 35.84 kg/m²     Result Review :   The following data was reviewed by: Anisha Carmona MD on 04/13/2021:  Most Recent A1C    HGBA1C Most Recent 4/13/21   Hemoglobin A1C 13                    Physical Exam  Vitals reviewed.   Constitutional:       Appearance: Normal appearance. He is well-developed.   HENT:      Head: Normocephalic and atraumatic.   Eyes:      General:         Right eye: No discharge.         Left eye: No discharge.   Cardiovascular:      Rate and Rhythm: Normal rate and regular rhythm.      Heart sounds: Normal heart sounds. No murmur heard.   No friction rub. No gallop.    Pulmonary:      Effort: Pulmonary effort is normal. No respiratory distress.      Breath sounds: Normal breath sounds. No wheezing or rales.   Musculoskeletal:      Right shoulder: No swelling, deformity, tenderness or bony tenderness. Normal range of motion. Normal " strength.   Skin:     General: Skin is warm and dry.      Findings: No rash.   Neurological:      Mental Status: He is alert and oriented to person, place, and time.      Coordination: Coordination normal.      Gait: Gait normal.   Psychiatric:         Behavior: Behavior is cooperative.         Assessment and Plan:  Problems Addressed this Visit        Cardiac and Vasculature    Essential hypertension     Hypertension is improving with lifestyle modifications.  Continue current treatment regimen.  Blood pressure will be reassessed in 3 months.            Endocrine and Metabolic    Type 2 diabetes mellitus without complication, without long-term current use of insulin (CMS/[facility]) - Primary     A1C 13 4/13/21    New onset. Handouts given for diet change. Meter and strips sent. Questions answered. He has already started changing his diet.   Start metformin\Diagnosis, treatment and and course discussed. Potential side effects discussed. Return if there is worsening or persistence of symptoms.            Relevant Medications    metFORMIN (GLUCOPHAGE) 500 MG tablet       Eye    Vision changes     Secondary to glucose elevation            Musculoskeletal and Injuries    Acute pain of both knees     Osteoarthritis, discussed nsaids. Consider imaging if not better         Acute pain of right shoulder    Relevant Orders    XR Shoulder 2+ View Right       Symptoms and Signs    Polyphagia      Other Visit Diagnoses     Hyperglycemia        Relevant Orders    POC Glucose (Completed)    POC Glycosylated Hemoglobin (Hb A1C) (Completed)      Diagnoses       Codes Comments    Type 2 diabetes mellitus without complication, without long-term current use of insulin (CMS/[facility])    -  Primary ICD-10-CM: E11.9  ICD-9-CM: 250.00     Hyperglycemia     ICD-10-CM: R73.9  ICD-9-CM: 790.29     Essential hypertension     ICD-10-CM: I10  ICD-9-CM: 401.9     Acute pain of both knees     ICD-10-CM: M25.561, M25.562  ICD-9-CM: 338.19, 719.46     Acute  pain of right shoulder     ICD-10-CM: M25.511  ICD-9-CM: 719.41     Vision changes     ICD-10-CM: H53.9  ICD-9-CM: 368.9     Polyphagia     ICD-10-CM: R63.2  ICD-9-CM: 783.6            An After Visit Summary and PPPS were given to the patient.         I wore protective equipment throughout this patient encounter to include mask. Hand hygiene was performed before donning protective equipment and after removal when leaving the room.

## 2021-04-13 NOTE — ASSESSMENT & PLAN NOTE
A1C 13 4/13/21    New onset. Handouts given for diet change. Meter and strips sent. Questions answered. He has already started changing his diet.   Start metformin\Diagnosis, treatment and and course discussed. Potential side effects discussed. Return if there is worsening or persistence of symptoms.

## 2021-04-19 ENCOUNTER — TELEPHONE (OUTPATIENT)
Dept: FAMILY MEDICINE CLINIC | Facility: CLINIC | Age: 51
End: 2021-04-19

## 2021-04-19 RX ORDER — LANCETS
1 EACH MISCELLANEOUS DAILY
Qty: 100 EACH | Refills: 6 | Status: SHIPPED | OUTPATIENT
Start: 2021-04-19 | End: 2023-01-25 | Stop reason: SDUPTHER

## 2021-04-19 RX ORDER — BLOOD SUGAR DIAGNOSTIC
1 STRIP MISCELLANEOUS DAILY
Qty: 50 EACH | Refills: 12 | Status: SHIPPED | OUTPATIENT
Start: 2021-04-19 | End: 2023-01-25 | Stop reason: SDUPTHER

## 2021-04-19 RX ORDER — BLOOD-GLUCOSE METER
1 EACH MISCELLANEOUS DAILY
Qty: 1 KIT | Refills: 0 | Status: SHIPPED | OUTPATIENT
Start: 2021-04-19

## 2021-04-19 NOTE — TELEPHONE ENCOUNTER
I sent him a glucometer and strips just in case. I think he has an old one but I wanted to send some supplies just in case

## 2021-04-19 NOTE — ASSESSMENT & PLAN NOTE
Hypertension is improving with lifestyle modifications.  Continue current treatment regimen.  Blood pressure will be reassessed in 3 months.

## 2021-04-22 LAB
ALBUMIN SERPL-MCNC: 4.5 G/DL (ref 4–5)
ALBUMIN/GLOB SERPL: 1.9 {RATIO} (ref 1.2–2.2)
ALP SERPL-CCNC: 90 IU/L (ref 39–117)
ALT SERPL-CCNC: 45 IU/L (ref 0–44)
AST SERPL-CCNC: 36 IU/L (ref 0–40)
BASOPHILS # BLD AUTO: 0.1 X10E3/UL (ref 0–0.2)
BASOPHILS NFR BLD AUTO: 1 %
BILIRUB SERPL-MCNC: 1.2 MG/DL (ref 0–1.2)
BUN SERPL-MCNC: 13 MG/DL (ref 6–24)
BUN/CREAT SERPL: 15 (ref 9–20)
CALCIUM SERPL-MCNC: 9.5 MG/DL (ref 8.7–10.2)
CHLORIDE SERPL-SCNC: 102 MMOL/L (ref 96–106)
CHOLEST SERPL-MCNC: 155 MG/DL (ref 100–199)
CHOLEST/HDLC SERPL: 3.2 RATIO (ref 0–5)
CO2 SERPL-SCNC: 23 MMOL/L (ref 20–29)
CREAT SERPL-MCNC: 0.89 MG/DL (ref 0.76–1.27)
EOSINOPHIL # BLD AUTO: 0.1 X10E3/UL (ref 0–0.4)
EOSINOPHIL NFR BLD AUTO: 2 %
ERYTHROCYTE [DISTWIDTH] IN BLOOD BY AUTOMATED COUNT: 13 % (ref 11.6–15.4)
GLOBULIN SER CALC-MCNC: 2.4 G/DL (ref 1.5–4.5)
GLUCOSE SERPL-MCNC: 212 MG/DL (ref 65–99)
HCT VFR BLD AUTO: 44.5 % (ref 37.5–51)
HCV RNA SERPL NAA+PROBE-ACNC: NORMAL IU/ML
HDLC SERPL-MCNC: 48 MG/DL
HGB BLD-MCNC: 15.2 G/DL (ref 13–17.7)
IMM GRANULOCYTES # BLD AUTO: 0 X10E3/UL (ref 0–0.1)
IMM GRANULOCYTES NFR BLD AUTO: 0 %
LDLC SERPL CALC-MCNC: 81 MG/DL (ref 0–99)
LYMPHOCYTES # BLD AUTO: 1.7 X10E3/UL (ref 0.7–3.1)
LYMPHOCYTES NFR BLD AUTO: 29 %
MCH RBC QN AUTO: 28 PG (ref 26.6–33)
MCHC RBC AUTO-ENTMCNC: 34.2 G/DL (ref 31.5–35.7)
MCV RBC AUTO: 82 FL (ref 79–97)
MONOCYTES # BLD AUTO: 0.4 X10E3/UL (ref 0.1–0.9)
MONOCYTES NFR BLD AUTO: 7 %
NEUTROPHILS # BLD AUTO: 3.5 X10E3/UL (ref 1.4–7)
NEUTROPHILS NFR BLD AUTO: 61 %
PLATELET # BLD AUTO: 231 X10E3/UL (ref 150–450)
POTASSIUM SERPL-SCNC: 4.4 MMOL/L (ref 3.5–5.2)
PROT SERPL-MCNC: 6.9 G/DL (ref 6–8.5)
PSA SERPL-MCNC: 1.1 NG/ML (ref 0–4)
RBC # BLD AUTO: 5.42 X10E6/UL (ref 4.14–5.8)
SODIUM SERPL-SCNC: 140 MMOL/L (ref 134–144)
TEST INFORMATION: NORMAL
TRIGL SERPL-MCNC: 148 MG/DL (ref 0–149)
TSH SERPL DL<=0.005 MIU/L-ACNC: 2.75 UIU/ML (ref 0.45–4.5)
VLDLC SERPL CALC-MCNC: 26 MG/DL (ref 5–40)
WBC # BLD AUTO: 5.8 X10E3/UL (ref 3.4–10.8)

## 2021-04-26 ENCOUNTER — OFFICE VISIT (OUTPATIENT)
Dept: FAMILY MEDICINE CLINIC | Facility: CLINIC | Age: 51
End: 2021-04-26

## 2021-04-26 VITALS
HEART RATE: 101 BPM | RESPIRATION RATE: 18 BRPM | TEMPERATURE: 98.4 F | DIASTOLIC BLOOD PRESSURE: 86 MMHG | HEIGHT: 67 IN | SYSTOLIC BLOOD PRESSURE: 132 MMHG | OXYGEN SATURATION: 96 % | BODY MASS INDEX: 36.1 KG/M2 | WEIGHT: 230 LBS

## 2021-04-26 DIAGNOSIS — Z00.01 ANNUAL VISIT FOR GENERAL ADULT MEDICAL EXAMINATION WITH ABNORMAL FINDINGS: ICD-10-CM

## 2021-04-26 DIAGNOSIS — Z12.5 SCREENING PSA (PROSTATE SPECIFIC ANTIGEN): ICD-10-CM

## 2021-04-26 DIAGNOSIS — I10 ESSENTIAL HYPERTENSION: ICD-10-CM

## 2021-04-26 DIAGNOSIS — Z87.891 HISTORY OF TOBACCO USE: ICD-10-CM

## 2021-04-26 DIAGNOSIS — E11.9 TYPE 2 DIABETES MELLITUS WITHOUT COMPLICATION, WITHOUT LONG-TERM CURRENT USE OF INSULIN (HCC): Primary | ICD-10-CM

## 2021-04-26 LAB
BILIRUB BLD-MCNC: NEGATIVE MG/DL
CLARITY, POC: CLEAR
COLOR UR: YELLOW
GLUCOSE BLDC GLUCOMTR-MCNC: 283 MG/DL (ref 70–130)
GLUCOSE UR STRIP-MCNC: NEGATIVE MG/DL
KETONES UR QL: NEGATIVE
LEUKOCYTE EST, POC: NEGATIVE
NITRITE UR-MCNC: NEGATIVE MG/ML
PH UR: 5 [PH] (ref 5–8)
POC CREATININE URINE: 0
POC MICROALBUMIN URINE: 50
PROT UR STRIP-MCNC: NEGATIVE MG/DL
RBC # UR STRIP: NEGATIVE /UL
SP GR UR: 1.01 (ref 1–1.03)
UROBILINOGEN UR QL: NORMAL

## 2021-04-26 PROCEDURE — 82962 GLUCOSE BLOOD TEST: CPT | Performed by: FAMILY MEDICINE

## 2021-04-26 PROCEDURE — 99396 PREV VISIT EST AGE 40-64: CPT | Performed by: FAMILY MEDICINE

## 2021-04-26 PROCEDURE — 99213 OFFICE O/P EST LOW 20 MIN: CPT | Performed by: FAMILY MEDICINE

## 2021-04-26 PROCEDURE — 82044 UR ALBUMIN SEMIQUANTITATIVE: CPT | Performed by: FAMILY MEDICINE

## 2021-04-26 PROCEDURE — 81002 URINALYSIS NONAUTO W/O SCOPE: CPT | Performed by: FAMILY MEDICINE

## 2021-04-26 NOTE — ASSESSMENT & PLAN NOTE
A1C 13 4/13/21    Slowly improving. FSBS 160-200s now. Increase metformin to 1000mg twice a day.   Diagnosis, treatment and and course discussed. Potential side effects discussed. Return if there is worsening or persistence of symptoms.

## 2021-04-26 NOTE — PROGRESS NOTES
Chief Complaint   Patient presents with   • Annual Exam   • Hypertension   • Diabetes       Subjective   Bob Rebollar is a 50 y.o. male here for a Annual Visit. Energy level is described as good and he is sleeping well. He sleeps 6 hours nightly. Patient exercises regularly 5 times weekly. Nutrition is described as working on eating better, cutting back on food portion size.. His   libido is normal. He reports that he does not perform monthly testicular exam.    Hypertension  This is a chronic problem. The current episode started more than 1 year ago. Pertinent negatives include no chest pain, headaches, palpitations, shortness of breath or sweats. Risk factors for coronary artery disease include male gender.   Diabetes  He presents for his follow-up diabetic visit. Pertinent negatives for hypoglycemia include no headaches, nervousness/anxiousness or sweats. Pertinent negatives for diabetes include no chest pain and no weakness. His overall blood glucose range is >200 mg/dl. (In office blood sugar today was 283.) An ACE inhibitor/angiotensin II receptor blocker is not being taken. He does not see a podiatrist.Eye exam is not current.        I personally reviewed and updated the patient's allergies, medications, problem list, and past medical, surgical, social, and family history.     Allergies:  No Known Allergies    Social History:  Social History     Socioeconomic History   • Marital status:      Spouse name: Not on file   • Number of children: Not on file   • Years of education: Not on file   • Highest education level: Not on file   Tobacco Use   • Smoking status: Former Smoker     Packs/day: 1.00     Years: 8.00     Pack years: 8.00     Types: Cigarettes     Quit date:      Years since quittin.3   • Smokeless tobacco: Never Used   Substance and Sexual Activity   • Alcohol use: No   • Drug use: Never   • Sexual activity: Defer       Family History:  Family History   Problem Relation Age of Onset    • Diabetes Mother         diabetes mellitus   • Heart disease Mother         cardiovascular disease, ischemic   • Diabetes Brother         diabetes mellitus       Past Medical History :  Active Ambulatory Problems     Diagnosis Date Noted   • Acute idiopathic gout of left ankle 07/09/2019   • Allergic rhinitis 07/09/2019   • History of tobacco use 09/29/2011   • Polyarthropathy or polyarthritis 07/09/2019   • Thoracic disc herniation 07/09/2019   • Essential hypertension 07/09/2019   • SI (sacroiliac) joint dysfunction 07/09/2019   • Gastroesophageal reflux disease without esophagitis 02/06/2020   • Acute pain of both knees 04/13/2021   • Acute pain of right shoulder 04/13/2021   • Vision changes 04/13/2021   • Polyphagia 04/13/2021   • Type 2 diabetes mellitus without complication, without long-term current use of insulin (CMS/[facility]) 04/13/2021   • Annual visit for general adult medical examination with abnormal findings 04/26/2021   • Screening PSA (prostate specific antigen) 04/26/2021     Resolved Ambulatory Problems     Diagnosis Date Noted   • Abrasion 07/06/2020   • Non-recurrent acute serous otitis media of right ear 10/15/2020   • Acute non-recurrent maxillary sinusitis 10/15/2020     Past Medical History:   Diagnosis Date   • Abdominal pain    • Acute bronchitis due to other specified organisms    • Acute left-sided low back pain with left-sided sciatica    • Acute maxillary sinusitis    • Acute pain of left knee    • Acute serous otitis media    • Acute sinusitis    • Acute upper respiratory infection    • Ankle pain    • Annual physical exam    • Benign skin lesion    • Chest pain    • Constipation    • Dysuria    • Edema    • Elbow pain    • Exposure to influenza    • External hemorrhoid    • Fever    • Generalized abdominal pain    • Laboratory examination    • Lateral epicondylitis    • Low back pain    • Obesity    • Overweight (BMI 25.0-29.9)    • Polyarthropathy or polyarthritis of multiple sites     • Sciatic pain    • Screening for hyperlipidemia    • Tick bite    • White coat syndrome with hypertension    • Work-related condition        Medication List:    Current Outpatient Medications:   •  Accu-Chek FastClix Lancets misc, 1 each by Other route Daily., Disp: 100 each, Rfl: 6  •  acetaminophen (TYLENOL) 500 MG tablet, Take 500 mg by mouth Every 6 (Six) Hours As Needed for Mild Pain ., Disp: , Rfl:   •  albuterol (PROVENTIL) (2.5 MG/3ML) 0.083% nebulizer solution, Take 2.5 mg by nebulization Every 4 (Four) Hours As Needed for Wheezing., Disp: 90 vial, Rfl: 0  •  albuterol sulfate  (90 Base) MCG/ACT inhaler, Inhale 2 puffs Every 4 (Four) Hours As Needed for Wheezing., Disp: 1 inhaler, Rfl: 0  •  aspirin 81 MG chewable tablet, Chew 81 mg Daily., Disp: , Rfl:   •  bismuth subsalicylate (PEPTO BISMOL) 262 MG/15ML suspension, Take 15 mL by mouth Every 6 (Six) Hours As Needed for Indigestion., Disp: , Rfl:   •  Blood Glucose Monitoring Suppl (Accu-Chek Guide) w/Device kit, 1 each Daily., Disp: 1 kit, Rfl: 0  •  budesonide-formoterol (SYMBICORT) 160-4.5 MCG/ACT inhaler, Inhale 2 puffs 2 (Two) Times a Day., Disp: 1 inhaler, Rfl: 0  •  calcium carbonate (TUMS) 500 MG chewable tablet, Chew 1 tablet As Needed for Indigestion or Heartburn., Disp: , Rfl:   •  carboxymethylcellulose (REFRESH PLUS) 0.5 % solution, 3 (Three) Times a Day As Needed for Dry Eyes., Disp: , Rfl:   •  coenzyme Q10 100 MG capsule, Take 100 mg by mouth Daily., Disp: , Rfl:   •  colchicine (COLCRYS) 0.6 MG tablet, Take 0.6 mg by mouth As Needed., Disp: , Rfl:   •  diclofenac (VOLTAREN) 50 MG EC tablet, Take 1 tablet by mouth 2 (Two) Times a Day. (Patient taking differently: Take 50 mg by mouth As Needed.), Disp: 60 tablet, Rfl: 12  •  glucose blood (Accu-Chek Guide) test strip, 1 each by Other route Daily. Use as instructed, Disp: 50 each, Rfl: 12  •  ibuprofen (ADVIL,MOTRIN) 200 MG tablet, Take 200 mg by mouth Every 6 (Six) Hours As Needed  for Mild Pain ., Disp: , Rfl:   •  ipratropium (ATROVENT) 0.02 % nebulizer solution, Take 2.5 mL by nebulization Every 8 (Eight) Hours., Disp: 100 mL, Rfl: 12  •  MegaRed Omega-3 Krill Oil 350 MG capsule, Take  by mouth., Disp: , Rfl:   •  Menthol-Methyl Salicylate (BENGAY ARTHRITIS FORMULA EX), As Needed., Disp: , Rfl:   •  montelukast (SINGULAIR) 10 MG tablet, Take 1 tablet by mouth Every Night., Disp: 30 tablet, Rfl: 12  •  polyethylene glycol (MiraLax) 17 g packet, Take 17 g by mouth As Needed., Disp: , Rfl:   •  rosuvastatin (CRESTOR) 10 MG tablet, Take 10 mg by mouth Daily., Disp: , Rfl:   •  Saw Palmetto 1000 MG capsule, Take  by mouth., Disp: , Rfl:   •  vitamin C (ASCORBIC ACID) 250 MG tablet, Take 250 mg by mouth Daily., Disp: , Rfl:   •  vitamin D3 125 MCG (5000 UT) capsule capsule, Take 5,000 Units by mouth Daily., Disp: , Rfl:   •  Zinc 100 MG tablet, Take  by mouth., Disp: , Rfl:   •  metFORMIN (GLUCOPHAGE) 1000 MG tablet, Take 1 tablet by mouth 2 (Two) Times a Day With Meals., Disp: 60 tablet, Rfl: 12    Past Surgical History:  History reviewed. No pertinent surgical history.    Depression Screen:   PHQ-2/PHQ-9 Depression Screening 4/26/2021   Little interest or pleasure in doing things 0   Feeling down, depressed, or hopeless 0   Total Score 0       Fall Risk Screen:  [facility].    Review Of Systems:  Review of Systems   Constitutional: Negative for activity change and fever.   HENT: Negative for ear pain, rhinorrhea, sinus pressure and voice change.    Eyes: Negative for visual disturbance.   Respiratory: Negative for cough and shortness of breath.    Cardiovascular: Negative for chest pain and palpitations.   Gastrointestinal: Negative for abdominal pain, diarrhea, nausea and vomiting.   Endocrine: Negative for cold intolerance and heat intolerance.   Genitourinary: Negative for frequency and urgency.   Musculoskeletal: Negative for arthralgias.   Skin:  "Negative for rash.   Neurological: Negative for syncope and weakness.   Hematological: Does not bruise/bleed easily.   Psychiatric/Behavioral: Negative for depressed mood. The patient is not nervous/anxious.        Physical Exam:  Vital Signs:  Visit Vitals  /86   Pulse 101   Temp 98.4 °F (36.9 °C) (Temporal)   Resp 18   Ht 170.2 cm (67\")   Wt 104 kg (230 lb)   SpO2 96% Comment: room air   BMI 36.02 kg/m²       Physical Exam  Vitals reviewed.   Constitutional:       General: He is not in acute distress.     Appearance: He is well-developed. He is not diaphoretic.   HENT:      Head: Normocephalic and atraumatic.      Right Ear: External ear normal.      Left Ear: External ear normal.      Nose: Nose normal.      Mouth/Throat:      Pharynx: No oropharyngeal exudate.   Eyes:      General: No scleral icterus.        Right eye: No discharge.         Left eye: No discharge.      Conjunctiva/sclera: Conjunctivae normal.      Pupils: Pupils are equal, round, and reactive to light.   Neck:      Thyroid: No thyromegaly.      Trachea: No tracheal deviation.   Cardiovascular:      Rate and Rhythm: Normal rate and regular rhythm.      Heart sounds: Normal heart sounds. No murmur heard.   No friction rub. No gallop.    Pulmonary:      Effort: Pulmonary effort is normal. No respiratory distress.      Breath sounds: Normal breath sounds. No stridor. No wheezing or rales.   Abdominal:      General: Bowel sounds are normal. There is no distension.      Palpations: Abdomen is soft. There is no mass.      Tenderness: There is no abdominal tenderness. There is no guarding or rebound.      Hernia: There is no hernia in the left inguinal area.   Genitourinary:     Penis: Normal. No tenderness or discharge.       Testes: Normal.         Right: Mass, tenderness or swelling not present.         Left: Mass, tenderness or swelling not present.   Musculoskeletal:         General: No tenderness or deformity. Normal range of motion.      " Cervical back: Normal range of motion and neck supple.   Feet:      Right foot:      Protective Sensation: 10 sites tested. 10 sites sensed.      Left foot:      Protective Sensation: 10 sites tested. 10 sites sensed.   Skin:     General: Skin is warm and dry.      Capillary Refill: Capillary refill takes less than 2 seconds.      Coloration: Skin is not pale.      Findings: No erythema or rash.   Neurological:      Mental Status: He is alert and oriented to person, place, and time. He is not disoriented.      Cranial Nerves: No cranial nerve deficit.      Sensory: No sensory deficit.      Motor: No tremor, atrophy or abnormal muscle tone.      Coordination: Coordination normal.      Gait: Gait normal.      Deep Tendon Reflexes: Reflexes are normal and symmetric. Reflexes normal.   Psychiatric:         Behavior: Behavior normal.         Thought Content: Thought content normal.         Judgment: Judgment normal.       Diabetic Foot Exam Performed and Monofilament Test Performed      Assessment and Plan:  Problem List Items Addressed This Visit        Cardiac and Vasculature    Essential hypertension    Overview     White coat HTN type            Endocrine and Metabolic    Type 2 diabetes mellitus without complication, without long-term current use of insulin (CMS/[facility]) - Primary    Overview     A1C 13 4/13/21    Slowly improving. FSBS 160-200s now. Increase metformin to 1000mg twice a day.   Diagnosis, treatment and and course discussed. Potential side effects discussed. Return if there is worsening or persistence of symptoms.            Current Assessment & Plan     A1C 13 4/13/21    Slowly improving. FSBS 160-200s now. Increase metformin to 1000mg twice a day.   Diagnosis, treatment and and course discussed. Potential side effects discussed. Return if there is worsening or persistence of symptoms.            Relevant Medications    metFORMIN (GLUCOPHAGE) 1000 MG tablet    Other Relevant Orders    POCT Glucose  (Completed)    POC Microalbumin (Completed)       Genitourinary and Reproductive     Screening PSA (prostate specific antigen)    Overview     PSA 1.1 4/26/21            Health Encounters    Annual visit for general adult medical examination with abnormal findings    Relevant Orders    POCT urinalysis dipstick, manual (Completed)       Tobacco    History of tobacco use          An After Visit Summary and PPPS were given to the patient.       Discussed injury prevention, diet and exercise, safe sexual practices, and screening for common diseases. Encouraged use of sunscreen and seatbelts. Discussed timing of screenings. Encouraged monthly testicular exams, yearly physical exams. Avoidance of tobacco encouraged. Limitation or avoidance of alcohol encouraged. Recommend yearly dental and eye exams. Also discussed monitoring of blood pressure, lipids.     I wore protective equipment throughout this patient encounter to include mask and gloves. Hand hygiene was performed before donning protective equipment and after removal when leaving the room.

## 2021-04-26 NOTE — PROGRESS NOTES
No chief complaint on file.        Subjective   Bob Rebollar is a 50 y.o. male here for a Well Woman Visit. Energy level is described as {Good Fair Poor:67252} and he is sleeping {DESC; WELL/FAIRLY WELL/POORLY:17968}. He sleeps {NUMBERS:08541} hours nightly. Last pap was ***. Contraception: {MGContraception:40425}. Patient exercises {exercise:56755}. Nutrition is described as {Desc; diets:47770}. His   libido is {NORMAL/ABNORMAL ONLY:75354}. He reports that she {DOES/ DOES NOT:67993} perform monthly self breast exam.      Hypertension  This is a chronic problem. The current episode started more than 1 year ago. The problem has been waxing and waning since onset. The problem is uncontrolled. There are no associated agents to hypertension. Risk factors for coronary artery disease include diabetes mellitus, dyslipidemia and male gender. Past treatments include nothing. Current antihypertension treatment includes nothing. There are no compliance problems.    Diabetes  He presents for his follow-up diabetic visit. He has type 2 diabetes mellitus. Risk factors for coronary artery disease include hypertension, male sex, dyslipidemia and diabetes mellitus. Current diabetic treatment includes oral agent (monotherapy).        I personally reviewed and updated the patient's allergies, medications, problem list, and past medical, surgical, social, and family history.     Allergies:  No Known Allergies    Social History:  Social History     Socioeconomic History   • Marital status:      Spouse name: Not on file   • Number of children: Not on file   • Years of education: Not on file   • Highest education level: Not on file   Tobacco Use   • Smoking status: Former Smoker     Packs/day: 1.00     Years: 8.00     Pack years: 8.00     Types: Cigarettes     Quit date:      Years since quittin.3   • Smokeless tobacco: Never Used   Substance and Sexual Activity   • Alcohol use: No   • Drug use: Never   • Sexual  activity: Defer       Family History:  Family History   Problem Relation Age of Onset   • Diabetes Mother         diabetes mellitus   • Heart disease Mother         cardiovascular disease, ischemic   • Diabetes Brother         diabetes mellitus       Past Medical History :  Active Ambulatory Problems     Diagnosis Date Noted   • Acute idiopathic gout of left ankle 07/09/2019   • Allergic rhinitis 07/09/2019   • History of tobacco use 09/29/2011   • Polyarthropathy or polyarthritis 07/09/2019   • Thoracic disc herniation 07/09/2019   • Essential hypertension 07/09/2019   • SI (sacroiliac) joint dysfunction 07/09/2019   • Gastroesophageal reflux disease without esophagitis 02/06/2020   • Acute pain of both knees 04/13/2021   • Acute pain of right shoulder 04/13/2021   • Vision changes 04/13/2021   • Polyphagia 04/13/2021   • Type 2 diabetes mellitus without complication, without long-term current use of insulin (CMS/[facility]) 04/13/2021     Resolved Ambulatory Problems     Diagnosis Date Noted   • Abrasion 07/06/2020   • Non-recurrent acute serous otitis media of right ear 10/15/2020   • Acute non-recurrent maxillary sinusitis 10/15/2020     Past Medical History:   Diagnosis Date   • Abdominal pain    • Acute bronchitis due to other specified organisms    • Acute left-sided low back pain with left-sided sciatica    • Acute maxillary sinusitis    • Acute pain of left knee    • Acute serous otitis media    • Acute sinusitis    • Acute upper respiratory infection    • Ankle pain    • Annual physical exam    • Benign skin lesion    • Chest pain    • Constipation    • Dysuria    • Edema    • Elbow pain    • Exposure to influenza    • External hemorrhoid    • Fever    • Generalized abdominal pain    • Laboratory examination    • Lateral epicondylitis    • Low back pain    • Obesity    • Overweight (BMI 25.0-29.9)    • Polyarthropathy or polyarthritis of multiple sites    • Sciatic pain    • Screening for hyperlipidemia    •  Screening PSA (prostate specific antigen)    • Tick bite    • White coat syndrome with hypertension    • Work-related condition        Medication List:    Current Outpatient Medications:   •  Accu-Chek FastClix Lancets misc, 1 each by Other route Daily., Disp: 100 each, Rfl: 6  •  acetaminophen (TYLENOL) 500 MG tablet, Take 500 mg by mouth Every 6 (Six) Hours As Needed for Mild Pain ., Disp: , Rfl:   •  albuterol (PROVENTIL) (2.5 MG/3ML) 0.083% nebulizer solution, Take 2.5 mg by nebulization Every 4 (Four) Hours As Needed for Wheezing., Disp: 90 vial, Rfl: 0  •  albuterol sulfate  (90 Base) MCG/ACT inhaler, Inhale 2 puffs Every 4 (Four) Hours As Needed for Wheezing., Disp: 1 inhaler, Rfl: 0  •  aspirin 81 MG chewable tablet, Chew 81 mg Daily., Disp: , Rfl:   •  bismuth subsalicylate (PEPTO BISMOL) 262 MG/15ML suspension, Take 15 mL by mouth Every 6 (Six) Hours As Needed for Indigestion., Disp: , Rfl:   •  Blood Glucose Monitoring Suppl (Accu-Chek Guide) w/Device kit, 1 each Daily., Disp: 1 kit, Rfl: 0  •  budesonide-formoterol (SYMBICORT) 160-4.5 MCG/ACT inhaler, Inhale 2 puffs 2 (Two) Times a Day., Disp: 1 inhaler, Rfl: 0  •  calcium carbonate (TUMS) 500 MG chewable tablet, Chew 1 tablet As Needed for Indigestion or Heartburn., Disp: , Rfl:   •  carboxymethylcellulose (REFRESH PLUS) 0.5 % solution, 3 (Three) Times a Day As Needed for Dry Eyes., Disp: , Rfl:   •  coenzyme Q10 100 MG capsule, Take 100 mg by mouth Daily., Disp: , Rfl:   •  colchicine (COLCRYS) 0.6 MG tablet, Take 0.6 mg by mouth As Needed., Disp: , Rfl:   •  diclofenac (VOLTAREN) 50 MG EC tablet, Take 1 tablet by mouth 2 (Two) Times a Day. (Patient taking differently: Take 50 mg by mouth As Needed.), Disp: 60 tablet, Rfl: 12  •  glucose blood (Accu-Chek Guide) test strip, 1 each by Other route Daily. Use as instructed, Disp: 50 each, Rfl: 12  •  ibuprofen (ADVIL,MOTRIN) 200 MG tablet, Take 200 mg by mouth Every 6 (Six) Hours As Needed for Mild  Pain ., Disp: , Rfl:   •  ipratropium (ATROVENT) 0.02 % nebulizer solution, Take 2.5 mL by nebulization Every 8 (Eight) Hours., Disp: 100 mL, Rfl: 12  •  MegaRed Omega-3 Krill Oil 350 MG capsule, Take  by mouth., Disp: , Rfl:   •  Menthol-Methyl Salicylate (BENGAY ARTHRITIS FORMULA EX), As Needed., Disp: , Rfl:   •  metFORMIN (GLUCOPHAGE) 500 MG tablet, Take 1 tablet by mouth 2 (Two) Times a Day With Meals., Disp: 60 tablet, Rfl: 12  •  montelukast (SINGULAIR) 10 MG tablet, Take 1 tablet by mouth Every Night., Disp: 30 tablet, Rfl: 12  •  polyethylene glycol (MiraLax) 17 g packet, Take 17 g by mouth As Needed., Disp: , Rfl:   •  rosuvastatin (CRESTOR) 10 MG tablet, Take 10 mg by mouth Daily., Disp: , Rfl:   •  Saw Palmetto 1000 MG capsule, Take  by mouth., Disp: , Rfl:   •  vitamin C (ASCORBIC ACID) 250 MG tablet, Take 250 mg by mouth Daily., Disp: , Rfl:   •  vitamin D3 125 MCG (5000 UT) capsule capsule, Take 5,000 Units by mouth Daily., Disp: , Rfl:   •  Zinc 100 MG tablet, Take  by mouth., Disp: , Rfl:     Past Surgical History:  No past surgical history on file.    Depression Screen:   PHQ-2/PHQ-9 Depression Screening 7/9/2019   Little interest or pleasure in doing things 0   Feeling down, depressed, or hopeless 0   Total Score 0       Fall Risk Screen:  [facility].    Review Of Systems:  Review of Systems    Physical Exam:  Vital Signs:  There were no vitals taken for this visit.    Physical Exam      Assessment and Plan:  Problem List Items Addressed This Visit     None          An After Visit Summary and PPPS were given to the patient.       Discussed injury prevention, diet and exercise, safe sexual practices, and screening for common diseases. Encouraged use of sunscreen and seatbelts. Discussed timing of  cervical cancer screening. Encouraged monthly self-breast exams, yearly clinical breast exams, and discussed timing of mammograms. Avoidance of tobacco encouraged.  "Limitation or avoidance of alcohol encouraged. Recommend yearly dental and eye exams. Also discussed monitoring of blood pressure, lipids.     I wore protective equipment throughout this patient encounter to include {Personal Protective Equipment:51006::\"mask\",\"eye protection\"}. Hand hygiene was performed before donning protective equipment and after removal when leaving the room.    "

## 2021-05-26 ENCOUNTER — TELEPHONE (OUTPATIENT)
Dept: FAMILY MEDICINE CLINIC | Facility: CLINIC | Age: 51
End: 2021-05-26

## 2021-05-26 DIAGNOSIS — E78.2 MIXED HYPERLIPIDEMIA: Primary | ICD-10-CM

## 2021-05-27 RX ORDER — ROSUVASTATIN CALCIUM 10 MG/1
TABLET, COATED ORAL
Qty: 90 TABLET | Refills: 3 | Status: SHIPPED | OUTPATIENT
Start: 2021-05-27 | End: 2022-05-16 | Stop reason: SDUPTHER

## 2021-10-04 ENCOUNTER — OFFICE VISIT (OUTPATIENT)
Dept: FAMILY MEDICINE CLINIC | Facility: CLINIC | Age: 51
End: 2021-10-04

## 2021-10-04 VITALS
HEART RATE: 93 BPM | RESPIRATION RATE: 18 BRPM | DIASTOLIC BLOOD PRESSURE: 86 MMHG | OXYGEN SATURATION: 98 % | BODY MASS INDEX: 36.26 KG/M2 | HEIGHT: 67 IN | WEIGHT: 231 LBS | SYSTOLIC BLOOD PRESSURE: 138 MMHG | TEMPERATURE: 97.7 F

## 2021-10-04 DIAGNOSIS — M54.50 ACUTE LEFT-SIDED LOW BACK PAIN WITHOUT SCIATICA: ICD-10-CM

## 2021-10-04 DIAGNOSIS — E78.5 DYSLIPIDEMIA: ICD-10-CM

## 2021-10-04 DIAGNOSIS — E78.2 MIXED HYPERLIPIDEMIA: ICD-10-CM

## 2021-10-04 DIAGNOSIS — I10 ESSENTIAL HYPERTENSION: ICD-10-CM

## 2021-10-04 DIAGNOSIS — E11.9 TYPE 2 DIABETES MELLITUS WITHOUT COMPLICATION, WITHOUT LONG-TERM CURRENT USE OF INSULIN (HCC): Primary | ICD-10-CM

## 2021-10-04 LAB
GLUCOSE BLDC GLUCOMTR-MCNC: 169 MG/DL (ref 70–130)
HBA1C MFR BLD: 8.6 %

## 2021-10-04 PROCEDURE — 3052F HG A1C>EQUAL 8.0%<EQUAL 9.0%: CPT | Performed by: FAMILY MEDICINE

## 2021-10-04 PROCEDURE — 99214 OFFICE O/P EST MOD 30 MIN: CPT | Performed by: FAMILY MEDICINE

## 2021-10-04 PROCEDURE — 83036 HEMOGLOBIN GLYCOSYLATED A1C: CPT | Performed by: FAMILY MEDICINE

## 2021-10-04 PROCEDURE — 96372 THER/PROPH/DIAG INJ SC/IM: CPT | Performed by: FAMILY MEDICINE

## 2021-10-04 RX ORDER — METHYLPREDNISOLONE ACETATE 80 MG/ML
80 INJECTION, SUSPENSION INTRA-ARTICULAR; INTRALESIONAL; INTRAMUSCULAR; SOFT TISSUE ONCE
Status: COMPLETED | OUTPATIENT
Start: 2021-10-04 | End: 2021-10-04

## 2021-10-04 RX ORDER — TIZANIDINE 4 MG/1
4 TABLET ORAL NIGHTLY PRN
Qty: 30 TABLET | Refills: 0 | Status: SHIPPED | OUTPATIENT
Start: 2021-10-04

## 2021-10-04 RX ADMIN — METHYLPREDNISOLONE ACETATE 80 MG: 80 INJECTION, SUSPENSION INTRA-ARTICULAR; INTRALESIONAL; INTRAMUSCULAR; SOFT TISSUE at 14:05

## 2021-10-04 NOTE — ASSESSMENT & PLAN NOTE
Ice three times a day for about 10-15 minutes for the first 1-2 days. Then may alternate heat and ice. Better body mechanics discussed. Home exercises discussed and hand out given. Discussed nsaids and if they can be taken. May need imaging and or PT if persists.  Discussed red flags, if there is severe pain, fever with pain, loss of movement in one or both legs pain, numbness in groin or both legs, trouble urinating or defecating on oneself, then patient is to go to the ER.     Discussed risks of steroids: hyperglycemia, osteoporosis, avascular necrosis, anxiety, insomnia and cataracts. Patient states understanding

## 2021-10-04 NOTE — PROGRESS NOTES
Subjective   Bob Rebollar is a 51 y.o. male.     Chief Complaint   Patient presents with   • Back Pain   • Diabetes       Patient also wanted to know if you could rx him tizanidine 4mg it helped him last time he hurt his back      Back Pain  This is a new problem. The current episode started in the past 7 days. The problem occurs daily. The problem has been gradually worsening since onset. The pain is present in the gluteal and lumbar spine. The quality of the pain is described as shooting and stabbing. The pain radiates to the left knee, left thigh, left foot, right foot, right knee and right thigh. The pain is moderate. The pain is the same all the time. The symptoms are aggravated by sitting, standing, twisting and lying down. Associated symptoms include leg pain. Pertinent negatives include no abdominal pain, bladder incontinence, bowel incontinence, chest pain, dysuria, fever, headaches, numbness, pelvic pain, tingling or weakness. He has tried ice and heat (tylenol 1000mg ibuprofen 200mg, elevating legs) for the symptoms.   Diabetes  He presents for his follow-up diabetic visit. He has type 2 diabetes mellitus. There are no hypoglycemic associated symptoms. Pertinent negatives for hypoglycemia include no headaches, nervousness/anxiousness or sweats. There are no diabetic associated symptoms. Pertinent negatives for diabetes include no blurred vision, no chest pain and no weakness. There are no hypoglycemic complications. Risk factors for coronary artery disease include diabetes mellitus, hypertension and male sex. Current diabetic treatment includes oral agent (monotherapy). He is compliant with treatment some of the time. He is following a generally unhealthy diet. He has not had a previous visit with a dietitian. He participates in exercise intermittently. His overall blood glucose range is 140-180 mg/dl. He does not see a podiatrist.Eye exam is current.   Hypertension  This is a chronic problem. The current  episode started more than 1 year ago. The problem has been waxing and waning since onset. The problem is controlled. Pertinent negatives include no anxiety, blurred vision, chest pain, headaches, malaise/fatigue, palpitations, shortness of breath or sweats. There are no associated agents to hypertension. Risk factors for coronary artery disease include diabetes mellitus and male gender. Past treatments include nothing. Current antihypertension treatment includes nothing. The current treatment provides no improvement. There are no compliance problems.         I personally reviewed and updated the patient's allergies, medications, problem list, and past medical, surgical, social, and family history. I have reviewed and confirmed the accuracy of the History of Present Illness and Review of Symptoms as documented by the MA/LPN/RN. Anisha Carmona MD    Allergies:  No Known Allergies    Social History:  Social History     Socioeconomic History   • Marital status:      Spouse name: Not on file   • Number of children: Not on file   • Years of education: Not on file   • Highest education level: Not on file   Tobacco Use   • Smoking status: Former Smoker     Packs/day: 1.00     Years: 8.00     Pack years: 8.00     Types: Cigarettes     Quit date:      Years since quittin.7   • Smokeless tobacco: Never Used   Substance and Sexual Activity   • Alcohol use: No   • Drug use: Never   • Sexual activity: Defer       Family History:  Family History   Problem Relation Age of Onset   • Diabetes Mother         diabetes mellitus   • Heart disease Mother         cardiovascular disease, ischemic   • Diabetes Brother         diabetes mellitus       Past Medical History :  Patient Active Problem List   Diagnosis   • Acute idiopathic gout of left ankle   • Allergic rhinitis   • History of tobacco use   • Polyarthropathy or polyarthritis   • Thoracic disc herniation   • Essential hypertension   • SI (sacroiliac) joint  dysfunction   • Gastroesophageal reflux disease without esophagitis   • Acute pain of both knees   • Acute pain of right shoulder   • Vision changes   • Polyphagia   • Type 2 diabetes mellitus without complication, without long-term current use of insulin ([facility])   • Annual visit for general adult medical examination with abnormal findings   • Screening PSA (prostate specific antigen)   • Acute left-sided low back pain without sciatica   • Dyslipidemia       Medication List:    Current Outpatient Medications:   •  Accu-Chek FastClix Lancets misc, 1 each by Other route Daily., Disp: 100 each, Rfl: 6  •  acetaminophen (TYLENOL) 500 MG tablet, Take 500 mg by mouth Every 6 (Six) Hours As Needed for Mild Pain ., Disp: , Rfl:   •  albuterol (PROVENTIL) (2.5 MG/3ML) 0.083% nebulizer solution, Take 2.5 mg by nebulization Every 4 (Four) Hours As Needed for Wheezing., Disp: 90 vial, Rfl: 0  •  albuterol sulfate  (90 Base) MCG/ACT inhaler, Inhale 2 puffs Every 4 (Four) Hours As Needed for Wheezing., Disp: 1 inhaler, Rfl: 0  •  Blood Glucose Monitoring Suppl (Accu-Chek Guide) w/Device kit, 1 each Daily., Disp: 1 kit, Rfl: 0  •  calcium carbonate (TUMS) 500 MG chewable tablet, Chew 1 tablet As Needed for Indigestion or Heartburn., Disp: , Rfl:   •  colchicine (COLCRYS) 0.6 MG tablet, Take 0.6 mg by mouth As Needed., Disp: , Rfl:   •  glucose blood (Accu-Chek Guide) test strip, 1 each by Other route Daily. Use as instructed, Disp: 50 each, Rfl: 12  •  ibuprofen (ADVIL,MOTRIN) 200 MG tablet, Take 200 mg by mouth Every 6 (Six) Hours As Needed for Mild Pain ., Disp: , Rfl:   •  ipratropium (ATROVENT) 0.02 % nebulizer solution, Take 2.5 mL by nebulization Every 8 (Eight) Hours., Disp: 100 mL, Rfl: 12  •  MegaRed Omega-3 Krill Oil 350 MG capsule, Take  by mouth., Disp: , Rfl:   •  Menthol-Methyl Salicylate (BENGAY ARTHRITIS FORMULA EX), As Needed., Disp: , Rfl:   •  metFORMIN (GLUCOPHAGE) 1000 MG tablet, Take 1 tablet by mouth 2  "(Two) Times a Day With Meals., Disp: 60 tablet, Rfl: 12  •  polyethylene glycol (MiraLax) 17 g packet, Take 17 g by mouth As Needed., Disp: , Rfl:   •  rosuvastatin (CRESTOR) 10 MG tablet, TAKE 1 TABLET BY MOUTH DAILY, Disp: 90 tablet, Rfl: 3  •  vitamin D3 125 MCG (5000 UT) capsule capsule, Take 5,000 Units by mouth Daily., Disp: , Rfl:   •  tiZANidine (ZANAFLEX) 4 MG tablet, Take 1 tablet by mouth At Night As Needed for Muscle Spasms., Disp: 30 tablet, Rfl: 0    Past Surgical History:  History reviewed. No pertinent surgical history.    Review of Systems:  Review of Systems   Constitutional: Negative for activity change, fever and malaise/fatigue.   HENT: Negative for ear pain, rhinorrhea, sinus pressure and voice change.    Eyes: Negative for blurred vision and visual disturbance.   Respiratory: Negative for cough and shortness of breath.    Cardiovascular: Negative for chest pain and palpitations.   Gastrointestinal: Negative for abdominal pain, bowel incontinence, diarrhea, nausea and vomiting.   Endocrine: Negative for cold intolerance and heat intolerance.   Genitourinary: Negative for dysuria, frequency, pelvic pain, urgency and urinary incontinence.   Musculoskeletal: Positive for back pain. Negative for arthralgias.   Skin: Negative for rash.   Neurological: Negative for tingling, syncope, weakness and numbness.   Hematological: Does not bruise/bleed easily.   Psychiatric/Behavioral: Negative for depressed mood. The patient is not nervous/anxious.        Physical Exam:  Vital Signs:  Vital Signs:   /86   Pulse 93   Temp 97.7 °F (36.5 °C)   Resp 18   Ht 170.2 cm (67\")   Wt 105 kg (231 lb)   SpO2 98%   BMI 36.18 kg/m²     Result Review :                Physical Exam  Vitals reviewed.   Constitutional:       General: He is not in acute distress.     Appearance: He is well-developed. He is not diaphoretic.   Eyes:      General:         Right eye: No discharge.         Left eye: No discharge. "   Cardiovascular:      Rate and Rhythm: Normal rate and regular rhythm.      Heart sounds: Normal heart sounds. No murmur heard.   No friction rub. No gallop.    Pulmonary:      Effort: Pulmonary effort is normal. No respiratory distress.      Breath sounds: Normal breath sounds. No wheezing or rales.   Musculoskeletal:         General: No deformity.      Lumbar back: No deformity, spasms or tenderness. Normal range of motion.   Skin:     General: Skin is warm and dry.      Findings: No rash.   Neurological:      Mental Status: He is alert and oriented to person, place, and time.      Motor: No abnormal muscle tone.      Coordination: Coordination normal.      Deep Tendon Reflexes: Reflexes normal.         Assessment and Plan:  Problems Addressed this Visit        Cardiac and Vasculature    Essential hypertension     Hypertension is unchanged.  Continue current treatment regimen.  Dietary sodium restriction.  Continue current medications.  Blood pressure will be reassessed in 3 months.         Dyslipidemia    Relevant Orders    Comprehensive Metabolic Panel    Lipid Panel With / Chol / HDL Ratio       Endocrine and Metabolic    Type 2 diabetes mellitus without complication, without long-term current use of insulin (HCC) - Primary     Diabetes is improving with treatment.   Continue current treatment regimen.  Reminded to bring in blood sugar diary at next visit.  Dietary recommendations for ADA diet.  Regular aerobic exercise.  Diabetes will be reassessed in 3 months.    A1C 13 4/13/21  A1C 8.6 10/4/21    Improving but still uncontrolled. He has not been faithful with his medication and diet.   Diagnosis, treatment and and course discussed. Potential side effects discussed. Return if there is worsening or persistence of symptoms.            Relevant Orders    POC Glycosylated Hemoglobin (Hb A1C) (Completed)    POC Glucose (Completed)       Musculoskeletal and Injuries    Acute left-sided low back pain without  sciatica     Ice three times a day for about 10-15 minutes for the first 1-2 days. Then may alternate heat and ice. Better body mechanics discussed. Home exercises discussed and hand out given. Discussed nsaids and if they can be taken. May need imaging and or PT if persists.  Discussed red flags, if there is severe pain, fever with pain, loss of movement in one or both legs pain, numbness in groin or both legs, trouble urinating or defecating on oneself, then patient is to go to the ER.     Discussed risks of steroids: hyperglycemia, osteoporosis, avascular necrosis, anxiety, insomnia and cataracts. Patient states understanding           Relevant Medications    tiZANidine (ZANAFLEX) 4 MG tablet    Other Relevant Orders    Ambulatory Referral to Physical Therapy Evaluate and treat      Other Visit Diagnoses     Mixed hyperlipidemia          Diagnoses       Codes Comments    Type 2 diabetes mellitus without complication, without long-term current use of insulin (HCC)    -  Primary ICD-10-CM: E11.9  ICD-9-CM: 250.00     Mixed hyperlipidemia     ICD-10-CM: E78.2  ICD-9-CM: 272.2     Acute left-sided low back pain without sciatica     ICD-10-CM: M54.50  ICD-9-CM: 724.2     Essential hypertension     ICD-10-CM: I10  ICD-9-CM: 401.9     Dyslipidemia     ICD-10-CM: E78.5  ICD-9-CM: 272.4            An After Visit Summary and PPPS were given to the patient.       I wore protective equipment throughout this patient encounter to include mask and eye protection. Hand hygiene was performed before donning protective equipment and after removal when leaving the room.

## 2021-10-07 NOTE — ASSESSMENT & PLAN NOTE
Hypertension is unchanged.  Continue current treatment regimen.  Dietary sodium restriction.  Continue current medications.  Blood pressure will be reassessed in 3 months.

## 2021-10-07 NOTE — ASSESSMENT & PLAN NOTE
Diabetes is improving with treatment.   Continue current treatment regimen.  Reminded to bring in blood sugar diary at next visit.  Dietary recommendations for ADA diet.  Regular aerobic exercise.  Diabetes will be reassessed in 3 months.    A1C 13 4/13/21  A1C 8.6 10/4/21    Improving but still uncontrolled. He has not been faithful with his medication and diet.   Diagnosis, treatment and and course discussed. Potential side effects discussed. Return if there is worsening or persistence of symptoms.

## 2021-10-13 LAB
ALBUMIN SERPL-MCNC: 4.8 G/DL (ref 3.8–4.9)
ALBUMIN/GLOB SERPL: 2 {RATIO} (ref 1.2–2.2)
ALP SERPL-CCNC: 83 IU/L (ref 44–121)
ALT SERPL-CCNC: 32 IU/L (ref 0–44)
AST SERPL-CCNC: 25 IU/L (ref 0–40)
BILIRUB SERPL-MCNC: 0.9 MG/DL (ref 0–1.2)
BUN SERPL-MCNC: 15 MG/DL (ref 6–24)
BUN/CREAT SERPL: 16 (ref 9–20)
CALCIUM SERPL-MCNC: 9.8 MG/DL (ref 8.7–10.2)
CHLORIDE SERPL-SCNC: 100 MMOL/L (ref 96–106)
CHOLEST SERPL-MCNC: 194 MG/DL (ref 100–199)
CHOLEST/HDLC SERPL: 4.2 RATIO (ref 0–5)
CO2 SERPL-SCNC: 23 MMOL/L (ref 20–29)
CREAT SERPL-MCNC: 0.92 MG/DL (ref 0.76–1.27)
GLOBULIN SER CALC-MCNC: 2.4 G/DL (ref 1.5–4.5)
GLUCOSE SERPL-MCNC: 141 MG/DL (ref 65–99)
HDLC SERPL-MCNC: 46 MG/DL
LDLC SERPL CALC-MCNC: 112 MG/DL (ref 0–99)
POTASSIUM SERPL-SCNC: 4.4 MMOL/L (ref 3.5–5.2)
PROT SERPL-MCNC: 7.2 G/DL (ref 6–8.5)
SODIUM SERPL-SCNC: 139 MMOL/L (ref 134–144)
TRIGL SERPL-MCNC: 209 MG/DL (ref 0–149)
VLDLC SERPL CALC-MCNC: 36 MG/DL (ref 5–40)

## 2021-10-14 ENCOUNTER — OFFICE VISIT (OUTPATIENT)
Dept: FAMILY MEDICINE CLINIC | Facility: CLINIC | Age: 51
End: 2021-10-14

## 2021-10-14 VITALS
BODY MASS INDEX: 35.5 KG/M2 | DIASTOLIC BLOOD PRESSURE: 93 MMHG | TEMPERATURE: 97.1 F | HEART RATE: 75 BPM | RESPIRATION RATE: 18 BRPM | SYSTOLIC BLOOD PRESSURE: 131 MMHG | OXYGEN SATURATION: 98 % | HEIGHT: 67 IN | WEIGHT: 226.2 LBS

## 2021-10-14 DIAGNOSIS — M54.50 ACUTE LEFT-SIDED LOW BACK PAIN WITHOUT SCIATICA: Primary | ICD-10-CM

## 2021-10-14 DIAGNOSIS — E78.5 DYSLIPIDEMIA: ICD-10-CM

## 2021-10-14 DIAGNOSIS — I10 ESSENTIAL HYPERTENSION: ICD-10-CM

## 2021-10-14 PROBLEM — H52.4 PRESBYOPIA: Status: ACTIVE | Noted: 2017-07-17

## 2021-10-14 PROBLEM — H52.10 MYOPIA: Status: ACTIVE | Noted: 2017-07-17

## 2021-10-14 PROBLEM — H52.229 REGULAR ASTIGMATISM: Status: ACTIVE | Noted: 2017-07-17

## 2021-10-14 PROCEDURE — 99214 OFFICE O/P EST MOD 30 MIN: CPT | Performed by: FAMILY MEDICINE

## 2021-10-14 NOTE — PROGRESS NOTES
Subjective   Bob Rebollar is a 51 y.o. male.     Chief Complaint   Patient presents with   • Back Pain       When legs would be elevated and patient would get up he would have a very sharp pain in his lower back  He also states that he has been having scaitic pain down his left leg but on the upper side not back side      Back Pain  This is a new problem. The current episode started in the past 7 days. The problem occurs daily. The problem has been gradually improving since onset. The pain is present in the gluteal and lumbar spine. The quality of the pain is described as stabbing and shooting. The pain radiates to the left knee and left thigh (front sciatic pain the past two days). The pain is moderate. The pain is the same all the time. The symptoms are aggravated by sitting, standing, twisting and lying down. Associated symptoms include leg pain. Pertinent negatives include no abdominal pain, bladder incontinence, bowel incontinence, dysuria, fever, numbness, pelvic pain or tingling. He has tried ice, heat and muscle relaxant (tylenol 1000mg ibuprofen 200mg, elevating legs) for the symptoms.        I personally reviewed and updated the patient's allergies, medications, problem list, and past medical, surgical, social, and family history. I have reviewed and confirmed the accuracy of the History of Present Illness and Review of Symptoms as documented by the MA/LPN/RN. Anisha Carmona MD    Allergies:  No Known Allergies    Social History:  Social History     Socioeconomic History   • Marital status:    Tobacco Use   • Smoking status: Former Smoker     Packs/day: 1.00     Years: 8.00     Pack years: 8.00     Types: Cigarettes     Quit date:      Years since quittin.8   • Smokeless tobacco: Never Used   Substance and Sexual Activity   • Alcohol use: No   • Drug use: Never   • Sexual activity: Defer       Family History:  Family History   Problem Relation Age of Onset   • Diabetes Mother          diabetes mellitus   • Heart disease Mother         cardiovascular disease, ischemic   • Diabetes Brother         diabetes mellitus       Past Medical History :  Patient Active Problem List   Diagnosis   • Acute idiopathic gout of left ankle   • Allergic rhinitis   • History of tobacco use   • Polyarthropathy or polyarthritis   • Thoracic disc herniation   • Essential hypertension   • SI (sacroiliac) joint dysfunction   • Gastroesophageal reflux disease without esophagitis   • Acute pain of both knees   • Acute pain of right shoulder   • Vision changes   • Polyphagia   • Type 2 diabetes mellitus without complication, without long-term current use of insulin ([facility])   • Annual visit for general adult medical examination with abnormal findings   • Screening PSA (prostate specific antigen)   • Acute left-sided low back pain without sciatica   • Dyslipidemia   • Myopia   • Regular astigmatism   • Presbyopia       Medication List:    Current Outpatient Medications:   •  Accu-Chek FastClix Lancets misc, 1 each by Other route Daily., Disp: 100 each, Rfl: 6  •  acetaminophen (TYLENOL) 500 MG tablet, Take 500 mg by mouth Every 6 (Six) Hours As Needed for Mild Pain ., Disp: , Rfl:   •  albuterol (PROVENTIL) (2.5 MG/3ML) 0.083% nebulizer solution, Take 2.5 mg by nebulization Every 4 (Four) Hours As Needed for Wheezing., Disp: 90 vial, Rfl: 0  •  albuterol sulfate  (90 Base) MCG/ACT inhaler, Inhale 2 puffs Every 4 (Four) Hours As Needed for Wheezing., Disp: 1 inhaler, Rfl: 0  •  Blood Glucose Monitoring Suppl (Accu-Chek Guide) w/Device kit, 1 each Daily., Disp: 1 kit, Rfl: 0  •  calcium carbonate (TUMS) 500 MG chewable tablet, Chew 1 tablet As Needed for Indigestion or Heartburn., Disp: , Rfl:   •  colchicine (COLCRYS) 0.6 MG tablet, Take 0.6 mg by mouth As Needed., Disp: , Rfl:   •  glucose blood (Accu-Chek Guide) test strip, 1 each by Other route Daily. Use as instructed, Disp: 50 each, Rfl: 12  •  ibuprofen  "(ADVIL,MOTRIN) 200 MG tablet, Take 200 mg by mouth Every 6 (Six) Hours As Needed for Mild Pain ., Disp: , Rfl:   •  ipratropium (ATROVENT) 0.02 % nebulizer solution, Take 2.5 mL by nebulization Every 8 (Eight) Hours., Disp: 100 mL, Rfl: 12  •  MegaRed Omega-3 Krill Oil 350 MG capsule, Take  by mouth., Disp: , Rfl:   •  Menthol-Methyl Salicylate (BENGAY ARTHRITIS FORMULA EX), As Needed., Disp: , Rfl:   •  metFORMIN (GLUCOPHAGE) 1000 MG tablet, Take 1 tablet by mouth 2 (Two) Times a Day With Meals., Disp: 60 tablet, Rfl: 12  •  polyethylene glycol (MiraLax) 17 g packet, Take 17 g by mouth As Needed., Disp: , Rfl:   •  rosuvastatin (CRESTOR) 10 MG tablet, TAKE 1 TABLET BY MOUTH DAILY, Disp: 90 tablet, Rfl: 3  •  vitamin D3 125 MCG (5000 UT) capsule capsule, Take 5,000 Units by mouth Daily., Disp: , Rfl:   •  tiZANidine (ZANAFLEX) 4 MG tablet, Take 1 tablet by mouth At Night As Needed for Muscle Spasms., Disp: 30 tablet, Rfl: 0    Past Surgical History:  No past surgical history on file.    Review of Systems:  Review of Systems   Constitutional: Negative for activity change and fever.   HENT: Negative for ear pain, rhinorrhea, sinus pressure and voice change.    Eyes: Negative for visual disturbance.   Respiratory: Negative for cough.    Gastrointestinal: Negative for abdominal pain, bowel incontinence, diarrhea, nausea and vomiting.   Endocrine: Negative for cold intolerance and heat intolerance.   Genitourinary: Negative for dysuria, frequency, pelvic pain, urgency and urinary incontinence.   Musculoskeletal: Positive for back pain. Negative for arthralgias.   Skin: Negative for rash.   Neurological: Negative for tingling, syncope and numbness.   Hematological: Does not bruise/bleed easily.   Psychiatric/Behavioral: Negative for depressed mood.       Physical Exam:  Vital Signs:  Vital Signs:   /93   Pulse 75   Temp 97.1 °F (36.2 °C)   Resp 18   Ht 170.2 cm (67\")   Wt 103 kg (226 lb 3.2 oz)   SpO2 98%   " BMI 35.43 kg/m²     Result Review :     Lab Results   Component Value Date    BUN 15 10/12/2021    CREATININE 0.92 10/12/2021    EGFRIFNONA 96 10/12/2021    EGFRIFAFRI 111 10/12/2021    BCR 16 10/12/2021    K 4.4 10/12/2021    CO2 23 10/12/2021    CALCIUM 9.8 10/12/2021    PROTENTOTREF 7.2 10/12/2021    ALBUMIN 4.8 10/12/2021    LABIL2 2.0 10/12/2021    AST 25 10/12/2021    ALT 32 10/12/2021     No results found for: CHOL  Lab Results   Component Value Date    TRIG 209 (H) 10/12/2021    TRIG 148 04/20/2021    TRIG 245 (H) 07/09/2019     Lab Results   Component Value Date    HDL 46 10/12/2021    HDL 48 04/20/2021    HDL 44 07/09/2019     Lab Results   Component Value Date     (H) 10/12/2021    LDL 81 04/20/2021     (H) 07/09/2019     Lab Results   Component Value Date    VLDL 36 10/12/2021    VLDL 26 04/20/2021    VLDL 49 (H) 07/09/2019     No results found for: LDLHDL              Physical Exam  Vitals reviewed.   Constitutional:       General: He is not in acute distress.     Appearance: He is well-developed. He is not diaphoretic.   Eyes:      General:         Right eye: No discharge.         Left eye: No discharge.   Cardiovascular:      Rate and Rhythm: Normal rate and regular rhythm.      Heart sounds: Normal heart sounds. No murmur heard.  No friction rub. No gallop.    Pulmonary:      Effort: Pulmonary effort is normal. No respiratory distress.      Breath sounds: Normal breath sounds. No wheezing or rales.   Musculoskeletal:         General: No deformity.      Lumbar back: No deformity, spasms or tenderness. Normal range of motion.   Skin:     General: Skin is warm and dry.      Findings: No rash.   Neurological:      Mental Status: He is alert and oriented to person, place, and time.      Motor: No abnormal muscle tone.      Coordination: Coordination normal.      Deep Tendon Reflexes: Reflexes normal.         Assessment and Plan:  Problems Addressed this Visit        Cardiac and Vasculature     Essential hypertension     Hypertension is unchanged.  Dietary sodium restriction.  Weight loss.  Continue current medications.  Blood pressure will be reassessed in 3 months.         Dyslipidemia       Musculoskeletal and Injuries    Acute left-sided low back pain without sciatica - Primary     Some better but still there. Will write note to go back on light duty. Continue current treatment           Diagnoses       Codes Comments    Acute left-sided low back pain without sciatica    -  Primary ICD-10-CM: M54.50  ICD-9-CM: 724.2     Essential hypertension     ICD-10-CM: I10  ICD-9-CM: 401.9     Dyslipidemia     ICD-10-CM: E78.5  ICD-9-CM: 272.4            An After Visit Summary and PPPS were given to the patient.       I wore protective equipment throughout this patient encounter to include mask and eye protection. Hand hygiene was performed before donning protective equipment and after removal when leaving the room.

## 2021-10-18 ENCOUNTER — TELEPHONE (OUTPATIENT)
Dept: FAMILY MEDICINE CLINIC | Facility: CLINIC | Age: 51
End: 2021-10-18

## 2021-10-18 NOTE — TELEPHONE ENCOUNTER
Called pt back and he stated that since he did not get hurt at work they could not put him on light duty so he needs either short term disability or back on full duty patient is at work today and is in a lot of pain. I told him that he can ask his HR for short term inability and I will fill out paper work for it today. Is it okay for you to give him short term disability

## 2021-10-18 NOTE — TELEPHONE ENCOUNTER
Caller: Bob Rebollar    Relationship: Self    Best call back number:[phone] (M)    What is the best time to reach you: ANYTIME, BUT PATIENT GETS OF WORK AT 3PM     Who are you requesting to speak with: CHAO    Do you know the name of the person who called:PATIENT     What was the call regarding: PATIENT NEEDS NICOLETTE RG'S MA TO CALL HIM REGARDING GOING ON SHORT TERM DISABILITY THROUGH HIS WORK. PLEASE ADVISE.     Do you require a callback: YES

## 2021-10-20 ENCOUNTER — TELEPHONE (OUTPATIENT)
Dept: FAMILY MEDICINE CLINIC | Facility: CLINIC | Age: 51
End: 2021-10-20

## 2021-10-20 NOTE — TELEPHONE ENCOUNTER
Caller: [employer]        Best call back number: [phone]    What is your medical concern? [employer]/ KALIN (NURSE) CALLED ON BEHALF OF PATIENT IN REGARDS TO A SHORT TERM DISABILITY CLAIM FOR BACK PAIN. KALIN IS NEEDING CLINICAL CLARIFICATION ON DIAGNOSIS

## 2021-10-20 NOTE — ASSESSMENT & PLAN NOTE
Hypertension is unchanged.  Dietary sodium restriction.  Weight loss.  Continue current medications.  Blood pressure will be reassessed in 3 months.

## 2021-10-21 NOTE — TELEPHONE ENCOUNTER
Leila called me and asked if there was a return to work yet for patient I told her know and that I have not received [facility] paperwork on patient yet. She stated that she will send it over.

## 2022-05-14 DIAGNOSIS — E78.2 MIXED HYPERLIPIDEMIA: ICD-10-CM

## 2022-05-16 RX ORDER — ROSUVASTATIN CALCIUM 10 MG/1
TABLET, COATED ORAL
Qty: 90 TABLET | Refills: 3 | Status: SHIPPED | OUTPATIENT
Start: 2022-05-16

## 2022-06-27 ENCOUNTER — OFFICE VISIT (OUTPATIENT)
Dept: FAMILY MEDICINE CLINIC | Facility: CLINIC | Age: 52
End: 2022-06-27

## 2022-06-27 VITALS
HEART RATE: 87 BPM | HEIGHT: 67 IN | DIASTOLIC BLOOD PRESSURE: 76 MMHG | RESPIRATION RATE: 16 BRPM | WEIGHT: 226 LBS | TEMPERATURE: 98.1 F | OXYGEN SATURATION: 98 % | BODY MASS INDEX: 35.47 KG/M2 | SYSTOLIC BLOOD PRESSURE: 120 MMHG

## 2022-06-27 DIAGNOSIS — M54.50 ACUTE RIGHT-SIDED LOW BACK PAIN WITHOUT SCIATICA: Primary | ICD-10-CM

## 2022-06-27 PROCEDURE — 99213 OFFICE O/P EST LOW 20 MIN: CPT | Performed by: FAMILY MEDICINE

## 2022-06-27 RX ORDER — PREDNISONE 10 MG/1
10 TABLET ORAL DAILY
Qty: 5 TABLET | Refills: 0 | Status: SHIPPED | OUTPATIENT
Start: 2022-06-27 | End: 2023-01-25

## 2022-06-27 NOTE — PROGRESS NOTES
Subjective   Bob Rebollar is a 51 y.o. male. Presents to [facility]    Chief Complaint   Patient presents with   • Back Pain     Right lumbar         Back Pain  This is a new problem. The current episode started yesterday. The problem occurs constantly. The problem has been gradually worsening since onset. The pain is present in the lumbar spine (right). The pain does not radiate. The pain is at a severity of 8/10 (to 10). The pain is severe. The pain is the same all the time. The symptoms are aggravated by bending, position, lying down, standing, sitting and twisting. Stiffness is present in the morning. Pertinent negatives include no abdominal pain, chest pain, fever, leg pain, numbness, tingling or weakness. He has tried ice, heat and bed rest (Tylenol, Ibuprofen, Zanaflex) for the symptoms. The treatment provided mild relief.        I personally reviewed and updated the patient's allergies, medications, problem list, and past medical, surgical, social, and family history. I have reviewed and confirmed the accuracy of the History of Present Illness and Review of Symptoms as documented by the MA/LPN/RN. Anisha Carmona MD    Allergies:  No Known Allergies    Social History:  Social History     Socioeconomic History   • Marital status:    Tobacco Use   • Smoking status: Former Smoker     Packs/day: 1.00     Years: 8.00     Pack years: 8.00     Types: Cigarettes     Quit date:      Years since quittin.5   • Smokeless tobacco: Never Used   Substance and Sexual Activity   • Alcohol use: No   • Drug use: Never   • Sexual activity: Defer       Family History:  Family History   Problem Relation Age of Onset   • Diabetes Mother         diabetes mellitus   • Heart disease Mother         cardiovascular disease, ischemic   • Diabetes Brother         diabetes mellitus       Past Medical History :  Patient Active Problem List   Diagnosis   • Acute idiopathic gout of left ankle   • Allergic  rhinitis   • History of tobacco use   • Polyarthropathy or polyarthritis   • Thoracic disc herniation   • Essential hypertension   • SI (sacroiliac) joint dysfunction   • Gastroesophageal reflux disease without esophagitis   • Acute pain of both knees   • Acute pain of right shoulder   • Vision changes   • Polyphagia   • Type 2 diabetes mellitus without complication, without long-term current use of insulin ([facility])   • Annual visit for general adult medical examination with abnormal findings   • Screening PSA (prostate specific antigen)   • Acute right-sided low back pain without sciatica   • Dyslipidemia   • Myopia   • Regular astigmatism   • Presbyopia       Medication List:    Current Outpatient Medications:   •  Accu-Chek FastClix Lancets misc, 1 each by Other route Daily., Disp: 100 each, Rfl: 6  •  acetaminophen (TYLENOL) 500 MG tablet, Take 500 mg by mouth Every 6 (Six) Hours As Needed for Mild Pain ., Disp: , Rfl:   •  albuterol (PROVENTIL) (2.5 MG/3ML) 0.083% nebulizer solution, Take 2.5 mg by nebulization Every 4 (Four) Hours As Needed for Wheezing., Disp: 90 vial, Rfl: 0  •  albuterol sulfate  (90 Base) MCG/ACT inhaler, Inhale 2 puffs Every 4 (Four) Hours As Needed for Wheezing., Disp: 1 inhaler, Rfl: 0  •  Blood Glucose Monitoring Suppl (Accu-Chek Guide) w/Device kit, 1 each Daily., Disp: 1 kit, Rfl: 0  •  calcium carbonate (TUMS) 500 MG chewable tablet, Chew 1 tablet As Needed for Indigestion or Heartburn., Disp: , Rfl:   •  colchicine 0.6 MG tablet, Take 0.6 mg by mouth As Needed., Disp: , Rfl:   •  glucose blood (Accu-Chek Guide) test strip, 1 each by Other route Daily. Use as instructed, Disp: 50 each, Rfl: 12  •  ibuprofen (ADVIL,MOTRIN) 200 MG tablet, Take 200 mg by mouth Every 6 (Six) Hours As Needed for Mild Pain ., Disp: , Rfl:   •  ipratropium (ATROVENT) 0.02 % nebulizer solution, Take 2.5 mL by nebulization Every 8 (Eight) Hours., Disp: 100 mL, Rfl: 12  •  Menthol-Methyl Salicylate  "([facility] ARTHRITIS FORMULA EX), As Needed., Disp: , Rfl:   •  metFORMIN (GLUCOPHAGE) 1000 MG tablet, Take 1 tablet by mouth 2 (Two) Times a Day With Meals., Disp: 60 tablet, Rfl: 12  •  polyethylene glycol (MIRALAX) 17 g packet, Take 17 g by mouth As Needed., Disp: , Rfl:   •  rosuvastatin (CRESTOR) 10 MG tablet, TAKE 1 TABLET BY MOUTH DAILY, Disp: 90 tablet, Rfl: 3  •  tiZANidine (ZANAFLEX) 4 MG tablet, Take 1 tablet by mouth At Night As Needed for Muscle Spasms., Disp: 30 tablet, Rfl: 0  •  vitamin D3 125 MCG (5000 UT) capsule capsule, Take 5,000 Units by mouth Daily., Disp: , Rfl:   •  predniSONE (DELTASONE) 10 MG tablet, Take 1 tablet by mouth Daily., Disp: 5 tablet, Rfl: 0    Past Surgical History:  History reviewed. No pertinent surgical history.    Review of Systems:  Review of Systems   Constitutional: Negative for activity change and fever.   HENT: Negative for ear pain, rhinorrhea, sinus pressure and voice change.    Eyes: Negative for visual disturbance.   Respiratory: Negative for cough and shortness of breath.    Cardiovascular: Negative for chest pain.   Gastrointestinal: Negative for abdominal pain, diarrhea, nausea and vomiting.   Endocrine: Negative for cold intolerance and heat intolerance.   Genitourinary: Negative for frequency and urgency.   Musculoskeletal: Positive for back pain. Negative for arthralgias.   Skin: Negative for rash.   Neurological: Negative for tingling, syncope, weakness and numbness.   Hematological: Does not bruise/bleed easily.   Psychiatric/Behavioral: Negative for depressed mood. The patient is not nervous/anxious.        Physical Exam:  Vital Signs:  Vital Signs:   /76 (BP Location: Right arm, Patient Position: Sitting, Cuff Size: Large Adult)   Pulse 87   Temp 98.1 °F (36.7 °C) (Skin)   Resp 16   Ht 170.2 cm (67\")   Wt 103 kg (226 lb)   SpO2 98%   BMI 35.40 kg/m²     Result Review :                Physical Exam  Vitals reviewed.   Constitutional:       " General: He is not in acute distress.     Appearance: Normal appearance. He is well-developed. He is not diaphoretic.   HENT:      Head: Normocephalic and atraumatic.   Eyes:      General:         Right eye: No discharge.         Left eye: No discharge.   Cardiovascular:      Rate and Rhythm: Normal rate and regular rhythm.      Heart sounds: Normal heart sounds. No murmur heard.    No friction rub. No gallop.   Pulmonary:      Effort: Pulmonary effort is normal. No respiratory distress.      Breath sounds: Normal breath sounds. No wheezing or rales.   Musculoskeletal:         General: No deformity.      Lumbar back: No deformity, spasms or tenderness. Normal range of motion.   Skin:     General: Skin is warm and dry.      Findings: No rash.   Neurological:      Mental Status: He is alert and oriented to person, place, and time.      Motor: No abnormal muscle tone.      Coordination: Coordination normal.      Gait: Gait normal.      Deep Tendon Reflexes: Reflexes normal.   Psychiatric:         Behavior: Behavior is cooperative.         Assessment and Plan:  Problems Addressed this Visit        Musculoskeletal and Injuries    Acute right-sided low back pain without sciatica - Primary     Ice three times a day for about 10-15 minutes for the first 1-2 days. Then may alternate heat and ice. Better body mechanics discussed. Home exercises discussed and hand out given. Discussed nsaids and if they can be taken. May need imaging and or PT if persists.  Discussed red flags, if there is severe pain, fever with pain, loss of movement in one or both legs pain, numbness in groin or both legs, trouble urinating or defecating on oneself, then patient is to go to the ER.     Discussed risks of steroids: hyperglycemia, osteoporosis, avascular necrosis, anxiety, insomnia and cataracts. Patient states understanding             Relevant Medications    predniSONE (DELTASONE) 10 MG tablet      Diagnoses       Codes Comments    Acute  right-sided low back pain without sciatica    -  Primary ICD-10-CM: M54.50  ICD-9-CM: 724.2            Class 2 Severe Obesity (BMI >=35 and <=39.9). Obesity-related health conditions include the following: diabetes mellitus. Obesity is unchanged. BMI is is above average; BMI management plan is completed. We discussed low calorie, low carb based diet program, portion control and increasing exercise.      An After Visit Summary and PPPS were given to the patient.       I wore protective equipment throughout this patient encounter to include mask and eyewear. Hand hygiene was performed before donning protective equipment and after removal when leaving the room.

## 2023-01-16 ENCOUNTER — TELEPHONE (OUTPATIENT)
Dept: FAMILY MEDICINE CLINIC | Facility: CLINIC | Age: 53
End: 2023-01-16

## 2023-01-16 DIAGNOSIS — I10 ESSENTIAL HYPERTENSION: ICD-10-CM

## 2023-01-16 DIAGNOSIS — E11.9 TYPE 2 DIABETES MELLITUS WITHOUT COMPLICATION, WITHOUT LONG-TERM CURRENT USE OF INSULIN: ICD-10-CM

## 2023-01-16 DIAGNOSIS — R53.81 MALAISE AND FATIGUE: ICD-10-CM

## 2023-01-16 DIAGNOSIS — R53.83 MALAISE AND FATIGUE: ICD-10-CM

## 2023-01-16 DIAGNOSIS — E78.2 MIXED HYPERLIPIDEMIA: Primary | ICD-10-CM

## 2023-01-16 NOTE — TELEPHONE ENCOUNTER
Caller: Bob Rebollar    Relationship: Self    Best call back number: [phone]      What orders are you requesting (i.e. lab or imaging): A1C CHECK    In what timeframe would the patient need to come in: THIS FRIDAY    Where will you receive your lab/imaging services: OFFICE    Additional notes: PATIENT STATES THAT HE HAS BEEN HAVING SOME HIGH SUGAR READINGS AND WANTS TO CHECK HIS A1C, HE HAS OFFICE VISIT SCHEDULED FOR NEXT WED AFTERNOON BUT WANTED TO GET LABS DRAWN THIS WEEK SO THEY WOULD HAVE SOME RESULTS TO LOOK AT APPT. PLEASE ADVISE WHEN HE CAN SCHEDULE.

## 2023-01-21 LAB
ALBUMIN SERPL-MCNC: 4.6 G/DL (ref 3.8–4.9)
ALBUMIN/GLOB SERPL: 1.8 {RATIO} (ref 1.2–2.2)
ALP SERPL-CCNC: 93 IU/L (ref 44–121)
ALT SERPL-CCNC: 38 IU/L (ref 0–44)
AST SERPL-CCNC: 26 IU/L (ref 0–40)
BASOPHILS # BLD AUTO: 0.1 X10E3/UL (ref 0–0.2)
BASOPHILS NFR BLD AUTO: 1 %
BILIRUB SERPL-MCNC: 1.1 MG/DL (ref 0–1.2)
BUN SERPL-MCNC: 14 MG/DL (ref 6–24)
BUN/CREAT SERPL: 15 (ref 9–20)
CALCIUM SERPL-MCNC: 9.3 MG/DL (ref 8.7–10.2)
CHLORIDE SERPL-SCNC: 100 MMOL/L (ref 96–106)
CHOLEST SERPL-MCNC: 159 MG/DL (ref 100–199)
CHOLEST/HDLC SERPL: 3.7 RATIO (ref 0–5)
CO2 SERPL-SCNC: 24 MMOL/L (ref 20–29)
CREAT SERPL-MCNC: 0.93 MG/DL (ref 0.76–1.27)
EGFRCR SERPLBLD CKD-EPI 2021: 99 ML/MIN/1.73
EOSINOPHIL # BLD AUTO: 0.1 X10E3/UL (ref 0–0.4)
EOSINOPHIL NFR BLD AUTO: 2 %
ERYTHROCYTE [DISTWIDTH] IN BLOOD BY AUTOMATED COUNT: 13 % (ref 11.6–15.4)
GLOBULIN SER CALC-MCNC: 2.5 G/DL (ref 1.5–4.5)
GLUCOSE SERPL-MCNC: 215 MG/DL (ref 70–99)
HBA1C MFR BLD: 10.9 % (ref 4.8–5.6)
HCT VFR BLD AUTO: 45.8 % (ref 37.5–51)
HDLC SERPL-MCNC: 43 MG/DL
HGB BLD-MCNC: 15.4 G/DL (ref 13–17.7)
IMM GRANULOCYTES # BLD AUTO: 0 X10E3/UL (ref 0–0.1)
IMM GRANULOCYTES NFR BLD AUTO: 0 %
LDLC SERPL CALC-MCNC: 78 MG/DL (ref 0–99)
LYMPHOCYTES # BLD AUTO: 1.9 X10E3/UL (ref 0.7–3.1)
LYMPHOCYTES NFR BLD AUTO: 25 %
MCH RBC QN AUTO: 27.7 PG (ref 26.6–33)
MCHC RBC AUTO-ENTMCNC: 33.6 G/DL (ref 31.5–35.7)
MCV RBC AUTO: 83 FL (ref 79–97)
MONOCYTES # BLD AUTO: 0.6 X10E3/UL (ref 0.1–0.9)
MONOCYTES NFR BLD AUTO: 8 %
NEUTROPHILS # BLD AUTO: 4.8 X10E3/UL (ref 1.4–7)
NEUTROPHILS NFR BLD AUTO: 64 %
PLATELET # BLD AUTO: 234 X10E3/UL (ref 150–450)
POTASSIUM SERPL-SCNC: 4.4 MMOL/L (ref 3.5–5.2)
PROT SERPL-MCNC: 7.1 G/DL (ref 6–8.5)
RBC # BLD AUTO: 5.55 X10E6/UL (ref 4.14–5.8)
SODIUM SERPL-SCNC: 138 MMOL/L (ref 134–144)
TRIGL SERPL-MCNC: 229 MG/DL (ref 0–149)
TSH SERPL DL<=0.005 MIU/L-ACNC: 4.35 UIU/ML (ref 0.45–4.5)
VLDLC SERPL CALC-MCNC: 38 MG/DL (ref 5–40)
WBC # BLD AUTO: 7.5 X10E3/UL (ref 3.4–10.8)

## 2023-01-23 NOTE — PROGRESS NOTES
Subjective   Bob Rebollar is a 52 y.o. male. Presents to [facility]    Chief Complaint   Patient presents with   • Diabetes   • Hypertension   • Hyperlipidemia       Diabetes  He presents for his follow-up diabetic visit. He has type 2 diabetes mellitus. Pertinent negatives for hypoglycemia include no headaches or sweats. Pertinent negatives for diabetes include no chest pain and no weakness. There are no hypoglycemic complications. There are no diabetic complications. Risk factors for coronary artery disease include diabetes mellitus, dyslipidemia, hypertension, male sex, obesity and family history. He is following a generally unhealthy diet. When asked about meal planning, he reported none. He participates in exercise intermittently. His overall blood glucose range is >200 mg/dl. (At home blood sugar was 205.) An ACE inhibitor/angiotensin II receptor blocker is not being taken. He does not see a podiatrist.Eye exam is current.   Hypertension  This is a chronic problem. The current episode started more than 1 year ago. The problem is uncontrolled. Pertinent negatives include no chest pain, headaches, malaise/fatigue, shortness of breath or sweats. Risk factors for coronary artery disease include dyslipidemia, diabetes mellitus, obesity, male gender and family history. Current antihypertension treatment includes nothing. The current treatment provides mild improvement.   Hyperlipidemia  This is a chronic problem. The current episode started more than 1 year ago. Exacerbating diseases include diabetes and obesity. Pertinent negatives include no chest pain or shortness of breath. Current antihyperlipidemic treatment includes statins. The current treatment provides moderate improvement of lipids. Risk factors for coronary artery disease include diabetes mellitus, dyslipidemia, family history, male sex, hypertension and obesity.        I personally reviewed and updated the patient's allergies,  medications, problem list, and past medical, surgical, social, and family history. I have reviewed and confirmed the accuracy of the History of Present Illness and Review of Symptoms as documented by the MA/LPN/RN. Anisha Carmona MD    Allergies:  No Known Allergies    Social History:  Social History     Socioeconomic History   • Marital status:    Tobacco Use   • Smoking status: Former     Packs/day: 1.00     Years: 8.00     Pack years: 8.00     Types: Cigarettes     Quit date:      Years since quittin.0   • Smokeless tobacco: Never   Substance and Sexual Activity   • Alcohol use: No   • Drug use: Never   • Sexual activity: Defer       Family History:  Family History   Problem Relation Age of Onset   • Diabetes Mother         diabetes mellitus   • Heart disease Mother         cardiovascular disease, ischemic   • Diabetes Brother         diabetes mellitus       Past Medical History :  Patient Active Problem List   Diagnosis   • Allergic rhinitis   • History of tobacco use   • Polyarthropathy or polyarthritis   • Thoracic disc herniation   • Essential hypertension   • SI (sacroiliac) joint dysfunction   • Gastroesophageal reflux disease without esophagitis   • Polyphagia   • Type 2 diabetes mellitus without complication, without long-term current use of insulin ([facility])   • Annual visit for general adult medical examination with abnormal findings   • Screening PSA (prostate specific antigen)   • Dyslipidemia   • Myopia   • Regular astigmatism   • Presbyopia       Medication List:    Current Outpatient Medications:   •  Accu-Chek FastClix Lancets misc, 1 each by Other route Daily., Disp: 100 each, Rfl: 6  •  acetaminophen (TYLENOL) 500 MG tablet, Take 500 mg by mouth Every 6 (Six) Hours As Needed for Mild Pain ., Disp: , Rfl:   •  albuterol (PROVENTIL) (2.5 MG/3ML) 0.083% nebulizer solution, Take 2.5 mg by nebulization Every 4 (Four) Hours As Needed for Wheezing., Disp: 90 vial, Rfl: 0  •  albuterol  sulfate  (90 Base) MCG/ACT inhaler, Inhale 2 puffs Every 4 (Four) Hours As Needed for Wheezing., Disp: 1 inhaler, Rfl: 0  •  Blood Glucose Monitoring Suppl (Accu-Chek Guide) w/Device kit, 1 each Daily., Disp: 1 kit, Rfl: 0  •  calcium carbonate (TUMS) 500 MG chewable tablet, Chew 1 tablet As Needed for Indigestion or Heartburn., Disp: , Rfl:   •  colchicine 0.6 MG tablet, Take 0.6 mg by mouth As Needed., Disp: , Rfl:   •  glucose blood (Accu-Chek Guide) test strip, 1 each by Other route Daily. Use as instructed, Disp: 50 each, Rfl: 12  •  ibuprofen (ADVIL,MOTRIN) 200 MG tablet, Take 200 mg by mouth Every 6 (Six) Hours As Needed for Mild Pain ., Disp: , Rfl:   •  ipratropium (ATROVENT) 0.02 % nebulizer solution, Take 2.5 mL by nebulization Every 8 (Eight) Hours., Disp: 100 mL, Rfl: 12  •  Menthol-Methyl Salicylate (BENGAY ARTHRITIS FORMULA EX), As Needed., Disp: , Rfl:   •  metFORMIN (GLUCOPHAGE) 1000 MG tablet, Take 1 tablet by mouth 2 (Two) Times a Day With Meals., Disp: 60 tablet, Rfl: 12  •  polyethylene glycol (MIRALAX) 17 g packet, Take 17 g by mouth As Needed., Disp: , Rfl:   •  rosuvastatin (CRESTOR) 10 MG tablet, TAKE 1 TABLET BY MOUTH DAILY, Disp: 90 tablet, Rfl: 3  •  tiZANidine (ZANAFLEX) 4 MG tablet, Take 1 tablet by mouth At Night As Needed for Muscle Spasms., Disp: 30 tablet, Rfl: 0  •  vitamin D3 125 MCG (5000 UT) capsule capsule, Take 5,000 Units by mouth Daily., Disp: , Rfl:   •  empagliflozin (Jardiance) 10 MG tablet tablet, Take 1 tablet by mouth Daily., Disp: 30 tablet, Rfl: 3  •  lisinopril (PRINIVIL,ZESTRIL) 10 MG tablet, Take 1 tablet by mouth Daily., Disp: 30 tablet, Rfl: 2    Past Surgical History:  No past surgical history on file.      Physical Exam:      Vital Signs:    Vitals:    01/25/23 1531   BP: 140/84   Pulse: 97   Resp: 18   Temp: 97.8 °F (36.6 °C)   SpO2: 97%        Wt Readings from Last 3 Encounters:   01/25/23 102 kg (224 lb 9.6 oz)   06/27/22 103 kg (226 lb)   10/14/21  103 kg (226 lb 3.2 oz)       Result Review :   The following data was reviewed by: Anisha Carmona MD on 01/25/2023:  CMP    CMP 1/20/23   Glucose 215 (A)   BUN 14   Creatinine 0.93   Sodium 138   Potassium 4.4   Chloride 100   Calcium 9.3   Total Protein 7.1   Albumin 4.6   Globulin 2.5   Total Bilirubin 1.1   Alkaline Phosphatase 93   AST (SGOT) 26   ALT (SGPT) 38   BUN/Creatinine Ratio 15   (A) Abnormal value            Lipid Panel    Lipid Panel 1/20/23   Total Cholesterol 159   Triglycerides 229 (A)   HDL Cholesterol 43   VLDL Cholesterol 38   LDL Cholesterol  78   (A) Abnormal value            Most Recent A1C    HGBA1C Most Recent 1/20/23   Hemoglobin A1C 10.9 (A)   (A) Abnormal value       Comments are available for some flowsheets but are not being displayed.                    Physical Exam  Vitals reviewed.   Constitutional:       Appearance: Normal appearance. He is well-developed.   HENT:      Head: Normocephalic and atraumatic.   Eyes:      General:         Right eye: No discharge.         Left eye: No discharge.   Cardiovascular:      Rate and Rhythm: Normal rate and regular rhythm.      Heart sounds: Normal heart sounds. No murmur heard.    No friction rub. No gallop.   Pulmonary:      Effort: Pulmonary effort is normal. No respiratory distress.      Breath sounds: Normal breath sounds. No wheezing or rales.   Skin:     General: Skin is warm and dry.      Findings: No rash.   Neurological:      Mental Status: He is alert and oriented to person, place, and time.      Coordination: Coordination normal.      Gait: Gait normal.   Psychiatric:         Behavior: Behavior is cooperative.         Assessment and Plan:  Problems Addressed this Visit        Cardiac and Vasculature    Essential hypertension     Hypertension is worsening.  Dietary sodium restriction.  Weight loss.  Medication changes per orders.  Blood pressure will be reassessed in 3 months.         Relevant Medications    lisinopril  (PRINIVIL,ZESTRIL) 10 MG tablet    Dyslipidemia       Endocrine and Metabolic    Type 2 diabetes mellitus without complication, without long-term current use of insulin ([facility]) - Primary     Diabetes is worsening.   Reminded to bring in blood sugar diary at next visit.  Dietary recommendations for ADA diet.  Regular aerobic exercise.  Medication changes per orders.  Diabetes will be reassessed in 3 months.         Relevant Medications    Accu-Chek FastClix Lancets misc    glucose blood (Accu-Chek Guide) test strip    metFORMIN (GLUCOPHAGE) 1000 MG tablet    empagliflozin (Jardiance) 10 MG tablet tablet       Tobacco    History of tobacco use   Diagnoses       Codes Comments    Type 2 diabetes mellitus without complication, without long-term current use of insulin ([facility]CC)    -  Primary ICD-10-CM: E11.9  ICD-9-CM: 250.00     Essential hypertension     ICD-10-CM: I10  ICD-9-CM: 401.9     Dyslipidemia     ICD-10-CM: E78.5  ICD-9-CM: 272.4     History of tobacco use     ICD-10-CM: Z87.891  ICD-9-CM: V15.82            Class 2 Severe Obesity (BMI >=35 and <=39.9). Obesity-related health conditions include the following: diabetes mellitus. Obesity is improving with lifestyle modifications. BMI is is above average; BMI management plan is completed. We discussed low calorie, low carb based diet program, portion control and increasing exercise.      An After Visit Summary and PPPS were given to the patient.       I wore protective equipment throughout this patient encounter to include mask and eyewear. Hand hygiene was performed before donning protective equipment and after removal when leaving the room.

## 2023-01-25 ENCOUNTER — OFFICE VISIT (OUTPATIENT)
Dept: FAMILY MEDICINE CLINIC | Facility: CLINIC | Age: 53
End: 2023-01-25
Payer: COMMERCIAL

## 2023-01-25 VITALS
OXYGEN SATURATION: 97 % | TEMPERATURE: 97.8 F | DIASTOLIC BLOOD PRESSURE: 84 MMHG | WEIGHT: 224.6 LBS | RESPIRATION RATE: 18 BRPM | HEART RATE: 97 BPM | SYSTOLIC BLOOD PRESSURE: 140 MMHG | BODY MASS INDEX: 35.25 KG/M2 | HEIGHT: 67 IN

## 2023-01-25 DIAGNOSIS — E11.9 TYPE 2 DIABETES MELLITUS WITHOUT COMPLICATION, WITHOUT LONG-TERM CURRENT USE OF INSULIN: Primary | ICD-10-CM

## 2023-01-25 DIAGNOSIS — Z87.891 HISTORY OF TOBACCO USE: ICD-10-CM

## 2023-01-25 DIAGNOSIS — E78.5 DYSLIPIDEMIA: ICD-10-CM

## 2023-01-25 DIAGNOSIS — I10 ESSENTIAL HYPERTENSION: ICD-10-CM

## 2023-01-25 PROBLEM — M54.50 ACUTE RIGHT-SIDED LOW BACK PAIN WITHOUT SCIATICA: Status: RESOLVED | Noted: 2021-10-04 | Resolved: 2023-01-25

## 2023-01-25 PROBLEM — M10.072 ACUTE IDIOPATHIC GOUT OF LEFT ANKLE: Status: RESOLVED | Noted: 2019-07-09 | Resolved: 2023-01-25

## 2023-01-25 PROBLEM — M25.562 ACUTE PAIN OF BOTH KNEES: Status: RESOLVED | Noted: 2021-04-13 | Resolved: 2023-01-25

## 2023-01-25 PROBLEM — M25.511 ACUTE PAIN OF RIGHT SHOULDER: Status: RESOLVED | Noted: 2021-04-13 | Resolved: 2023-01-25

## 2023-01-25 PROBLEM — H53.9 VISION CHANGES: Status: RESOLVED | Noted: 2021-04-13 | Resolved: 2023-01-25

## 2023-01-25 PROBLEM — M25.561 ACUTE PAIN OF BOTH KNEES: Status: RESOLVED | Noted: 2021-04-13 | Resolved: 2023-01-25

## 2023-01-25 PROCEDURE — 99214 OFFICE O/P EST MOD 30 MIN: CPT | Performed by: FAMILY MEDICINE

## 2023-01-25 RX ORDER — LANCETS
1 EACH MISCELLANEOUS DAILY
Qty: 100 EACH | Refills: 6 | Status: SHIPPED | OUTPATIENT
Start: 2023-01-25

## 2023-01-25 RX ORDER — LISINOPRIL 10 MG/1
10 TABLET ORAL DAILY
Qty: 30 TABLET | Refills: 2 | Status: SHIPPED | OUTPATIENT
Start: 2023-01-25

## 2023-02-02 ENCOUNTER — OFFICE VISIT (OUTPATIENT)
Dept: FAMILY MEDICINE CLINIC | Facility: CLINIC | Age: 53
End: 2023-02-02
Payer: COMMERCIAL

## 2023-02-02 VITALS
BODY MASS INDEX: 34.75 KG/M2 | HEIGHT: 67 IN | WEIGHT: 221.4 LBS | SYSTOLIC BLOOD PRESSURE: 130 MMHG | DIASTOLIC BLOOD PRESSURE: 86 MMHG | TEMPERATURE: 97.5 F | RESPIRATION RATE: 18 BRPM | HEART RATE: 95 BPM | OXYGEN SATURATION: 98 %

## 2023-02-02 DIAGNOSIS — Z00.01 ANNUAL VISIT FOR GENERAL ADULT MEDICAL EXAMINATION WITH ABNORMAL FINDINGS: Primary | ICD-10-CM

## 2023-02-02 DIAGNOSIS — Z12.5 SCREENING PSA (PROSTATE SPECIFIC ANTIGEN): ICD-10-CM

## 2023-02-02 DIAGNOSIS — E11.9 TYPE 2 DIABETES MELLITUS WITHOUT COMPLICATION, WITHOUT LONG-TERM CURRENT USE OF INSULIN: ICD-10-CM

## 2023-02-02 DIAGNOSIS — E78.2 MIXED HYPERLIPIDEMIA: ICD-10-CM

## 2023-02-02 DIAGNOSIS — Z87.891 HISTORY OF TOBACCO USE: ICD-10-CM

## 2023-02-02 DIAGNOSIS — I10 ESSENTIAL HYPERTENSION: ICD-10-CM

## 2023-02-02 DIAGNOSIS — E66.09 CLASS 1 OBESITY DUE TO EXCESS CALORIES WITH SERIOUS COMORBIDITY AND BODY MASS INDEX (BMI) OF 34.0 TO 34.9 IN ADULT: ICD-10-CM

## 2023-02-02 PROBLEM — E66.9 OBESITY (BMI 30.0-34.9): Status: ACTIVE | Noted: 2023-02-02

## 2023-02-02 PROBLEM — E66.811 OBESITY (BMI 30.0-34.9): Status: ACTIVE | Noted: 2023-02-02

## 2023-02-02 LAB
BILIRUB BLD-MCNC: NEGATIVE MG/DL
CLARITY, POC: CLEAR
COLOR UR: YELLOW
GLUCOSE UR STRIP-MCNC: ABNORMAL MG/DL
KETONES UR QL: NEGATIVE
LEUKOCYTE EST, POC: NEGATIVE
NITRITE UR-MCNC: NEGATIVE MG/ML
PH UR: 6 [PH] (ref 5–8)
POC MICROALBUMIN URINE: 0
PROT UR STRIP-MCNC: NEGATIVE MG/DL
RBC # UR STRIP: NEGATIVE /UL
SP GR UR: 1.01 (ref 1–1.03)
UROBILINOGEN UR QL: NORMAL

## 2023-02-02 PROCEDURE — 81002 URINALYSIS NONAUTO W/O SCOPE: CPT | Performed by: FAMILY MEDICINE

## 2023-02-02 PROCEDURE — 99396 PREV VISIT EST AGE 40-64: CPT | Performed by: FAMILY MEDICINE

## 2023-02-02 PROCEDURE — 82044 UR ALBUMIN SEMIQUANTITATIVE: CPT | Performed by: FAMILY MEDICINE

## 2023-02-02 NOTE — ASSESSMENT & PLAN NOTE
Patient's (Body mass index is 34.68 kg/m².) indicates that they are obese (BMI >30) with health conditions that include hypertension, diabetes mellitus and dyslipidemias . Weight is improving with lifestyle modifications. BMI  is above average; BMI management plan is completed. We discussed portion control and increasing exercise.

## 2023-02-03 LAB — PSA SERPL-MCNC: 0.9 NG/ML (ref 0–4)

## 2023-02-12 PROBLEM — E78.2 MIXED HYPERLIPIDEMIA: Status: ACTIVE | Noted: 2021-10-04

## 2023-02-12 PROBLEM — E66.09 CLASS 1 OBESITY DUE TO EXCESS CALORIES WITH SERIOUS COMORBIDITY AND BODY MASS INDEX (BMI) OF 34.0 TO 34.9 IN ADULT: Status: ACTIVE | Noted: 2023-02-02

## 2023-02-12 NOTE — ASSESSMENT & PLAN NOTE
Diabetes is worsening.   Continue current treatment regimen.  Diabetes will be reassessed in 3 months.    Sent jardiance last visit

## 2023-03-23 ENCOUNTER — TELEPHONE (OUTPATIENT)
Dept: FAMILY MEDICINE CLINIC | Facility: CLINIC | Age: 53
End: 2023-03-23

## 2023-03-23 ENCOUNTER — OFFICE VISIT (OUTPATIENT)
Dept: FAMILY MEDICINE CLINIC | Facility: CLINIC | Age: 53
End: 2023-03-23
Payer: COMMERCIAL

## 2023-03-23 VITALS
WEIGHT: 227.8 LBS | RESPIRATION RATE: 18 BRPM | SYSTOLIC BLOOD PRESSURE: 124 MMHG | BODY MASS INDEX: 35.75 KG/M2 | DIASTOLIC BLOOD PRESSURE: 82 MMHG | HEIGHT: 67 IN | HEART RATE: 120 BPM | OXYGEN SATURATION: 98 % | TEMPERATURE: 97.1 F

## 2023-03-23 DIAGNOSIS — Z87.891 HISTORY OF TOBACCO USE: ICD-10-CM

## 2023-03-23 DIAGNOSIS — E66.01 CLASS 2 SEVERE OBESITY DUE TO EXCESS CALORIES WITH SERIOUS COMORBIDITY AND BODY MASS INDEX (BMI) OF 35.0 TO 35.9 IN ADULT: ICD-10-CM

## 2023-03-23 DIAGNOSIS — J30.1 SEASONAL ALLERGIC RHINITIS DUE TO POLLEN: ICD-10-CM

## 2023-03-23 DIAGNOSIS — R05.1 ACUTE COUGH: Primary | ICD-10-CM

## 2023-03-23 LAB
EXPIRATION DATE: NORMAL
FLUAV AG UPPER RESP QL IA.RAPID: NOT DETECTED
FLUBV AG UPPER RESP QL IA.RAPID: NOT DETECTED
INTERNAL CONTROL: NORMAL
Lab: NORMAL
SARS-COV-2 AG UPPER RESP QL IA.RAPID: NOT DETECTED

## 2023-03-23 PROCEDURE — 99213 OFFICE O/P EST LOW 20 MIN: CPT | Performed by: FAMILY MEDICINE

## 2023-03-23 PROCEDURE — 87428 SARSCOV & INF VIR A&B AG IA: CPT | Performed by: FAMILY MEDICINE

## 2023-03-23 RX ORDER — MONTELUKAST SODIUM 10 MG/1
10 TABLET ORAL NIGHTLY
Qty: 30 TABLET | Refills: 12 | Status: SHIPPED | OUTPATIENT
Start: 2023-03-23

## 2023-03-23 NOTE — TELEPHONE ENCOUNTER
Caller: Bob Rebollar    Relationship: Self    Best call back number: [phone]    What medication are you requesting: ANTIBIOTIC     What are your current symptoms: CONGESTION, ITCHY THROAT, CONSTANT COUGH    How long have you been experiencing symptoms: 4 DAYS    Have you had these symptoms before:    [x] Yes  [] No    Have you been treated for these symptoms before:   [x] Yes  [] No    If a prescription is needed, what is your preferred pharmacy and phone number: [id number] [address] AT [facility] OF  [address]  [address] - [phone] [facility] [phone] [facility]     Additional notes:    PATIENT SAID HE THINKS HE CAUGHT SOMETHING MOWING GRASS OVER THE WEEKEND AND WANTS TO SEE IF HE CAN GET SOME MEDICATION SENT IN FOR IT    HE IS USING OVER THE COUNTER COUGH MEDICINE, COUGH DROPS AND AN ALBUTEROL INHALER

## 2023-03-23 NOTE — TELEPHONE ENCOUNTER
LVM that we would have to see him to have an antibiotic sent. Also told him that we do have an opening this afternoon    Rolf

## 2023-03-23 NOTE — ASSESSMENT & PLAN NOTE
Patient's (Body mass index is 35.68 kg/m².) indicates that they are obese (BMI >30) with health conditions that include hypertension, diabetes mellitus and dyslipidemias . Weight is worsening. BMI  is above average; BMI management plan is completed. We discussed portion control and increasing exercise.

## 2023-03-23 NOTE — PROGRESS NOTES
Subjective   Bob Rebollar is a 52 y.o. male. Presents to [facility]    Chief Complaint   Patient presents with   • Cough       Cough  This is a new problem. The current episode started in the past 7 days. The problem has been unchanged. The cough is non-productive. Associated symptoms include nasal congestion, rhinorrhea and a sore throat. Pertinent negatives include no ear congestion or postnasal drip. Nothing aggravates the symptoms. Treatments tried: equate cough medicine, alb. inh. , cough drops, vics rub. The treatment provided no relief.        I personally reviewed and updated the patient's allergies, medications, problem list, and past medical, surgical, social, and family history. I have reviewed and confirmed the accuracy of the History of Present Illness and Review of Symptoms as documented by the MA/LPN/RN. Anisha Carmona MD    Allergies:  No Known Allergies    Social History:  Social History     Socioeconomic History   • Marital status:    Tobacco Use   • Smoking status: Former     Packs/day: 1.00     Years: 8.00     Pack years: 8.00     Types: Cigarettes     Quit date:      Years since quittin.2   • Smokeless tobacco: Never   Substance and Sexual Activity   • Alcohol use: No   • Drug use: Never   • Sexual activity: Defer       Family History:  Family History   Problem Relation Age of Onset   • Diabetes Mother         diabetes mellitus   • Heart disease Mother         cardiovascular disease, ischemic   • Diabetes Brother         diabetes mellitus       Past Medical History :  Patient Active Problem List   Diagnosis   • Allergic rhinitis   • History of tobacco use   • Polyarthropathy or polyarthritis   • Thoracic disc herniation   • Essential hypertension   • SI (sacroiliac) joint dysfunction   • Gastroesophageal reflux disease without esophagitis   • Polyphagia   • Type 2 diabetes mellitus without complication, without long-term current use of insulin ([facility])   • Annual  visit for general adult medical examination with abnormal findings   • Screening PSA (prostate specific antigen)   • Mixed hyperlipidemia   • Myopia   • Regular astigmatism   • Presbyopia   • Class 2 severe obesity due to excess calories with serious comorbidity and body mass index (BMI) of 35.0 to 35.9 in adult (HCC)   • Acute cough       Medication List:    Current Outpatient Medications:   •  Accu-Chek FastClix Lancets misc, 1 each by Other route Daily., Disp: 100 each, Rfl: 6  •  acetaminophen (TYLENOL) 500 MG tablet, Take 1 tablet by mouth Every 6 (Six) Hours As Needed for Mild Pain., Disp: , Rfl:   •  albuterol (PROVENTIL) (2.5 MG/3ML) 0.083% nebulizer solution, Take 2.5 mg by nebulization Every 4 (Four) Hours As Needed for Wheezing., Disp: 90 vial, Rfl: 0  •  Blood Glucose Monitoring Suppl (Accu-Chek Guide) w/Device kit, 1 each Daily., Disp: 1 kit, Rfl: 0  •  calcium carbonate (TUMS) 500 MG chewable tablet, Chew 1 tablet As Needed for Indigestion or Heartburn., Disp: , Rfl:   •  colchicine 0.6 MG tablet, Take 1 tablet by mouth As Needed., Disp: , Rfl:   •  empagliflozin (Jardiance) 10 MG tablet tablet, Take 1 tablet by mouth Daily., Disp: 30 tablet, Rfl: 3  •  glucose blood (Accu-Chek Guide) test strip, 1 each by Other route Daily. Use as instructed, Disp: 50 each, Rfl: 12  •  ibuprofen (ADVIL,MOTRIN) 200 MG tablet, Take 1 tablet by mouth Every 6 (Six) Hours As Needed for Mild Pain., Disp: , Rfl:   •  ipratropium (ATROVENT) 0.02 % nebulizer solution, Take 2.5 mL by nebulization Every 8 (Eight) Hours., Disp: 100 mL, Rfl: 12  •  lisinopril (PRINIVIL,ZESTRIL) 10 MG tablet, Take 1 tablet by mouth Daily., Disp: 30 tablet, Rfl: 2  •  Menthol-Methyl Salicylate (BENGAY ARTHRITIS FORMULA EX), As Needed., Disp: , Rfl:   •  metFORMIN (GLUCOPHAGE) 1000 MG tablet, Take 1 tablet by mouth 2 (Two) Times a Day With Meals., Disp: 60 tablet, Rfl: 12  •  polyethylene glycol (MIRALAX) 17 g packet, Take 17 g by mouth As Needed.,  Disp: , Rfl:   •  rosuvastatin (CRESTOR) 10 MG tablet, TAKE 1 TABLET BY MOUTH DAILY, Disp: 90 tablet, Rfl: 3  •  tiZANidine (ZANAFLEX) 4 MG tablet, Take 1 tablet by mouth At Night As Needed for Muscle Spasms., Disp: 30 tablet, Rfl: 0  •  vitamin D3 125 MCG (5000 UT) capsule capsule, Take 1 capsule by mouth Daily., Disp: , Rfl:   •  albuterol sulfate  (90 Base) MCG/ACT inhaler, Inhale 2 puffs Every 4 (Four) Hours As Needed for Wheezing., Disp: 6.7 g, Rfl: 6  •  montelukast (SINGULAIR) 10 MG tablet, Take 1 tablet by mouth Every Night., Disp: 30 tablet, Rfl: 12    Past Surgical History:  No past surgical history on file.      Physical Exam:      Vital Signs:    Vitals:    03/23/23 1415   BP: 124/82   Pulse: 120   Resp: 18   Temp: 97.1 °F (36.2 °C)   SpO2: 98%        Wt Readings from Last 3 Encounters:   03/23/23 103 kg (227 lb 12.8 oz)   02/02/23 100 kg (221 lb 6.4 oz)   01/25/23 102 kg (224 lb 9.6 oz)       Result Review :   The following data was reviewed by: Anisha Carmona MD on 03/23/2023:            Latest Reference Range & Units 03/23/23 14:47   SARS Antigen Not Detected, Presumptive Negative  Not Detected   Expiration Date  na   Lot Number  na   Influenza A Antigen SYBIL Not Detected  Not Detected   Influenza B Antigen SYBIL Not Detected  Not Detected       Physical Exam  Vitals reviewed.   Constitutional:       Appearance: Normal appearance. He is well-developed.   HENT:      Head: Normocephalic and atraumatic.   Eyes:      General:         Right eye: No discharge.         Left eye: No discharge.   Cardiovascular:      Rate and Rhythm: Normal rate and regular rhythm.      Heart sounds: Normal heart sounds. No murmur heard.    No friction rub. No gallop.   Pulmonary:      Effort: Pulmonary effort is normal. No respiratory distress.      Breath sounds: Normal breath sounds. No wheezing or rales.   Skin:     General: Skin is warm and dry.      Findings: No rash.   Neurological:      Mental Status: He is alert  and oriented to person, place, and time.      Coordination: Coordination normal.      Gait: Gait normal.   Psychiatric:         Behavior: Behavior is cooperative.         Assessment and Plan:  Problems Addressed this Visit        Allergies and Adverse Reactions    Allergic rhinitis     Worse  Add singulair    Diagnosis, treatment and and course discussed. Potential side effects discussed. Return if there is worsening or persistence of symptoms.            Relevant Medications    montelukast (SINGULAIR) 10 MG tablet       Endocrine and Metabolic    Class 2 severe obesity due to excess calories with serious comorbidity and body mass index (BMI) of 35.0 to 35.9 in adult ([facility])     Patient's (Body mass index is 35.68 kg/m².) indicates that they are obese (BMI >30) with health conditions that include hypertension, diabetes mellitus and dyslipidemias . Weight is worsening. BMI  is above average; BMI management plan is completed. We discussed portion control and increasing exercise.             Tobacco    History of tobacco use       Other    Acute cough - Primary     increase fluids, tylenol for fever, motrin for pain. Humidifier to help with congestion and to sleep at night. Dicussed OTC meds, gargle with warm salt water. If there is recurrent fever, shortness of breath, lethargy, advised to come in to the office or go to the ER.           Relevant Orders    POCT SARS-CoV-2 Antigen SYBIL (Completed)   Diagnoses       Codes Comments    Acute cough    -  Primary ICD-10-CM: R05.1  ICD-9-CM: 786.2     Class 2 severe obesity due to excess calories with serious comorbidity and body mass index (BMI) of 35.0 to 35.9 in adult ([facility])     ICD-10-CM: E66.01, Z68.35  ICD-9-CM: 278.01, V85.35     History of tobacco use     ICD-10-CM: Z87.891  ICD-9-CM: V15.82     Seasonal allergic rhinitis due to pollen     ICD-10-CM: J30.1  ICD-9-CM: 477.0            BMI is >= 30 and <35. (Class 1 Obesity). The following options were offered after  discussion;: weight loss educational material (shared in after visit summary), exercise counseling/recommendations and nutrition counseling/recommendations      An After Visit Summary and PPPS were given to the patient.       I wore protective equipment throughout this patient encounter to include mask and eyewear. Hand hygiene was performed before donning protective equipment and after removal when leaving the room.

## 2023-03-27 ENCOUNTER — DOCUMENTATION (OUTPATIENT)
Dept: FAMILY MEDICINE CLINIC | Facility: CLINIC | Age: 53
End: 2023-03-27
Payer: COMMERCIAL

## 2023-03-29 DIAGNOSIS — J01.00 ACUTE NON-RECURRENT MAXILLARY SINUSITIS: ICD-10-CM

## 2023-03-29 PROBLEM — E66.812 CLASS 2 SEVERE OBESITY DUE TO EXCESS CALORIES WITH SERIOUS COMORBIDITY AND BODY MASS INDEX (BMI) OF 35.0 TO 35.9 IN ADULT: Status: ACTIVE | Noted: 2023-02-02

## 2023-03-29 PROBLEM — E66.01 CLASS 2 SEVERE OBESITY DUE TO EXCESS CALORIES WITH SERIOUS COMORBIDITY AND BODY MASS INDEX (BMI) OF 35.0 TO 35.9 IN ADULT: Status: ACTIVE | Noted: 2023-02-02

## 2023-03-29 RX ORDER — ALBUTEROL SULFATE 90 UG/1
2 AEROSOL, METERED RESPIRATORY (INHALATION) EVERY 4 HOURS PRN
Qty: 6.7 G | Refills: 6 | Status: SHIPPED | OUTPATIENT
Start: 2023-03-29

## 2023-03-29 NOTE — TELEPHONE ENCOUNTER
Caller: Bob Rebollar    Relationship: Self    Best call back number: [phone]    Requested Prescriptions:   Requested Prescriptions     Pending Prescriptions Disp Refills   • albuterol sulfate  (90 Base) MCG/ACT inhaler       Sig: Inhale 2 puffs Every 4 (Four) Hours As Needed for Wheezing.        Pharmacy where request should be sent: [id number] AT [facility]  & [facility] - [phone] [facility] [phone] FX     Last office visit with prescribing clinician: 3/23/2023   Last telemedicine visit with prescribing clinician: 2023   Next office visit with prescribing clinician: 2023     Additional details provided by patient: WILL NEED THESE FILLED, HIS ARE      Does the patient have less than a 3 day supply:  [x] Yes  [] No    Would you like a call back once the refill request has been completed: [] Yes [x] No    If the office needs to give you a call back, can they leave a voicemail: [] Yes [x] No    Monse Bartholomew   23 11:33 EDT

## 2023-03-30 NOTE — ASSESSMENT & PLAN NOTE
Worse  Add singulair    Diagnosis, treatment and and course discussed. Potential side effects discussed. Return if there is worsening or persistence of symptoms.

## 2023-04-21 DIAGNOSIS — I10 ESSENTIAL HYPERTENSION: ICD-10-CM

## 2023-04-24 RX ORDER — LISINOPRIL 10 MG/1
10 TABLET ORAL DAILY
Qty: 30 TABLET | Refills: 2 | Status: SHIPPED | OUTPATIENT
Start: 2023-04-24

## 2023-05-04 ENCOUNTER — TELEPHONE (OUTPATIENT)
Dept: FAMILY MEDICINE CLINIC | Facility: CLINIC | Age: 53
End: 2023-05-04

## 2023-05-04 DIAGNOSIS — E11.9 TYPE 2 DIABETES MELLITUS WITHOUT COMPLICATION, WITHOUT LONG-TERM CURRENT USE OF INSULIN: Primary | ICD-10-CM

## 2023-05-04 DIAGNOSIS — I10 ESSENTIAL HYPERTENSION: ICD-10-CM

## 2023-05-04 DIAGNOSIS — E78.2 MIXED HYPERLIPIDEMIA: ICD-10-CM

## 2023-05-04 NOTE — PROGRESS NOTES
Subjective   Bob Rebollar is a 52 y.o. male. Presents to [facility]    Chief Complaint   Patient presents with   • Diabetes   • Hypertension   • Hyperlipidemia       Diabetes  He presents for his follow-up diabetic visit. He has type 2 diabetes mellitus. Pertinent negatives for hypoglycemia include no headaches or sweats. Pertinent negatives for diabetes include no chest pain and no weakness. There are no hypoglycemic complications. There are no diabetic complications. Risk factors for coronary artery disease include diabetes mellitus, dyslipidemia, hypertension, male sex, obesity and family history. He is following a generally unhealthy diet. When asked about meal planning, he reported none. He participates in exercise intermittently. His overall blood glucose range is 140-180 mg/dl. (At home blood sugar was 150) An ACE inhibitor/angiotensin II receptor blocker is not being taken. He does not see a podiatrist.Eye exam is current.   Hypertension  This is a chronic problem. The current episode started more than 1 year ago. The problem is uncontrolled. Pertinent negatives include no chest pain, headaches, malaise/fatigue, palpitations, shortness of breath or sweats. Risk factors for coronary artery disease include dyslipidemia, diabetes mellitus, obesity, male gender and family history. Current antihypertension treatment includes nothing. The current treatment provides mild improvement.   Hyperlipidemia  This is a chronic problem. The current episode started more than 1 year ago. Exacerbating diseases include diabetes and obesity. Pertinent negatives include no chest pain or shortness of breath. Current antihyperlipidemic treatment includes statins. The current treatment provides moderate improvement of lipids. Risk factors for coronary artery disease include diabetes mellitus, dyslipidemia, family history, male sex, hypertension and obesity.        I personally reviewed and updated the patient's  allergies, medications, problem list, and past medical, surgical, social, and family history. I have reviewed and confirmed the accuracy of the History of Present Illness and Review of Symptoms as documented by the MA/LPN/RN. Anisha Carmona MD    Allergies:  No Known Allergies    Social History:  Social History     Socioeconomic History   • Marital status:    Tobacco Use   • Smoking status: Former     Packs/day: 1.00     Years: 8.00     Pack years: 8.00     Types: Cigarettes     Quit date:      Years since quittin.4   • Smokeless tobacco: Never   Substance and Sexual Activity   • Alcohol use: No   • Drug use: Never   • Sexual activity: Defer       Family History:  Family History   Problem Relation Age of Onset   • Diabetes Mother         diabetes mellitus   • Heart disease Mother         cardiovascular disease, ischemic   • Diabetes Brother         diabetes mellitus       Past Medical History :  Patient Active Problem List   Diagnosis   • Allergic rhinitis   • History of tobacco use   • Polyarthropathy or polyarthritis   • Thoracic disc herniation   • Essential hypertension   • SI (sacroiliac) joint dysfunction   • Gastroesophageal reflux disease without esophagitis   • Polyphagia   • Type 2 diabetes mellitus without complication, without long-term current use of insulin (HCC)   • Annual visit for general adult medical examination with abnormal findings   • Screening PSA (prostate specific antigen)   • Mixed hyperlipidemia   • Myopia   • Regular astigmatism   • Presbyopia   • Class 1 obesity due to excess calories with body mass index (BMI) of 34.0 to 34.9 in adult   • Acute cough   • Idiopathic chronic gout of multiple sites without tophus       Medication List:    Current Outpatient Medications:   •  Accu-Chek FastClix Lancets misc, 1 each by Other route Daily., Disp: 100 each, Rfl: 6  •  acetaminophen (TYLENOL) 500 MG tablet, Take 1 tablet by mouth Every 6 (Six) Hours As Needed for Mild Pain.,  Disp: , Rfl:   •  albuterol (PROVENTIL) (2.5 MG/3ML) 0.083% nebulizer solution, Take 2.5 mg by nebulization Every 4 (Four) Hours As Needed for Wheezing., Disp: 90 vial, Rfl: 0  •  albuterol sulfate  (90 Base) MCG/ACT inhaler, Inhale 2 puffs Every 4 (Four) Hours As Needed for Wheezing., Disp: 6.7 g, Rfl: 6  •  Blood Glucose Monitoring Suppl (Accu-Chek Guide) w/Device kit, 1 each Daily., Disp: 1 kit, Rfl: 0  •  calcium carbonate (TUMS) 500 MG chewable tablet, Chew 1 tablet As Needed for Indigestion or Heartburn., Disp: , Rfl:   •  colchicine 0.6 MG tablet, Take 1 tablet by mouth 2 (Two) Times a Day As Needed (gout flare)., Disp: 90 tablet, Rfl: 0  •  empagliflozin (Jardiance) 25 MG tablet tablet, Take 1 tablet by mouth Daily., Disp: 30 tablet, Rfl: 1  •  glucose blood (Accu-Chek Guide) test strip, 1 each by Other route Daily. Use as instructed, Disp: 50 each, Rfl: 12  •  ibuprofen (ADVIL,MOTRIN) 200 MG tablet, Take 1 tablet by mouth Every 6 (Six) Hours As Needed for Mild Pain., Disp: , Rfl:   •  ipratropium (ATROVENT) 0.02 % nebulizer solution, Take 2.5 mL by nebulization Every 8 (Eight) Hours., Disp: 100 mL, Rfl: 12  •  lisinopril (PRINIVIL,ZESTRIL) 10 MG tablet, TAKE 1 TABLET BY MOUTH DAILY, Disp: 30 tablet, Rfl: 2  •  loratadine (Claritin) 10 MG tablet, Take 1 tablet by mouth Daily., Disp: , Rfl:   •  Menthol-Methyl Salicylate (BENGAY ARTHRITIS FORMULA EX), As Needed., Disp: , Rfl:   •  metFORMIN (GLUCOPHAGE) 1000 MG tablet, Take 1 tablet by mouth 2 (Two) Times a Day With Meals., Disp: 60 tablet, Rfl: 12  •  montelukast (SINGULAIR) 10 MG tablet, Take 1 tablet by mouth Every Night., Disp: 30 tablet, Rfl: 12  •  polyethylene glycol (MIRALAX) 17 g packet, Take 17 g by mouth As Needed., Disp: , Rfl:   •  rosuvastatin (CRESTOR) 10 MG tablet, TAKE 1 TABLET BY MOUTH DAILY, Disp: 90 tablet, Rfl: 3  •  tiZANidine (ZANAFLEX) 4 MG tablet, Take 1 tablet by mouth At Night As Needed for Muscle Spasms., Disp: 30 tablet,  Rfl: 0  •  vitamin D3 125 MCG (5000 UT) capsule capsule, Take 1 capsule by mouth Daily., Disp: , Rfl:     Past Surgical History:  No past surgical history on file.      Physical Exam:      Vital Signs:    Vitals:    05/12/23 1456   BP: 130/84   Pulse: 99   Resp: 18   Temp: 97.1 °F (36.2 °C)   SpO2: 100%        Wt Readings from Last 3 Encounters:   05/12/23 100 kg (221 lb)   03/23/23 103 kg (227 lb 12.8 oz)   02/02/23 100 kg (221 lb 6.4 oz)       Result Review :                Physical Exam  Vitals reviewed.   Constitutional:       Appearance: Normal appearance. He is well-developed.   HENT:      Head: Normocephalic and atraumatic.   Eyes:      General:         Right eye: No discharge.         Left eye: No discharge.   Cardiovascular:      Rate and Rhythm: Normal rate and regular rhythm.      Heart sounds: Normal heart sounds. No murmur heard.    No friction rub. No gallop.   Pulmonary:      Effort: Pulmonary effort is normal. No respiratory distress.      Breath sounds: Normal breath sounds. No wheezing or rales.   Skin:     General: Skin is warm and dry.      Findings: No rash.   Neurological:      Mental Status: He is alert and oriented to person, place, and time.      Coordination: Coordination normal.      Gait: Gait normal.   Psychiatric:         Behavior: Behavior is cooperative.         Assessment and Plan:  Problems Addressed this Visit        Cardiac and Vasculature    Essential hypertension     Hypertension is unchanged.  Continue current treatment regimen.  Dietary sodium restriction.  Weight loss.  Blood pressure will be reassessed in 3 months.         Mixed hyperlipidemia       Endocrine and Metabolic    Type 2 diabetes mellitus without complication, without long-term current use of insulin (HCC) - Primary     Diabetes is worsening.   Continue current treatment regimen.  Diabetes will be reassessed in 3 months.         Relevant Medications    empagliflozin (Jardiance) 25 MG tablet tablet    Other  Relevant Orders    POCT urinalysis dipstick, manual (Completed)    Class 1 obesity due to excess calories with body mass index (BMI) of 34.0 to 34.9 in adult     Patient's (Body mass index is 34.61 kg/m².) indicates that they are obese (BMI >30) with health conditions that include hypertension, diabetes mellitus and dyslipidemias . Weight is improving with lifestyle modifications. BMI  is above average; BMI management plan is completed. We discussed portion control and increasing exercise.             Musculoskeletal and Injuries    Idiopathic chronic gout of multiple sites without tophus     unchanged  Medication refilled         Relevant Medications    colchicine 0.6 MG tablet       Tobacco    History of tobacco use   Diagnoses       Codes Comments    Type 2 diabetes mellitus without complication, without long-term current use of insulin (HCC)    -  Primary ICD-10-CM: E11.9  ICD-9-CM: 250.00     Essential hypertension     ICD-10-CM: I10  ICD-9-CM: 401.9     Mixed hyperlipidemia     ICD-10-CM: E78.2  ICD-9-CM: 272.2     Class 1 obesity due to excess calories with serious comorbidity and body mass index (BMI) of 34.0 to 34.9 in adult     ICD-10-CM: E66.09, Z68.34  ICD-9-CM: 278.00, V85.34     History of tobacco use     ICD-10-CM: Z87.891  ICD-9-CM: V15.82     Idiopathic chronic gout of multiple sites without tophus     ICD-10-CM: M1A.09X0  ICD-9-CM: 274.02     Type 2 diabetes mellitus without complication, without long-term current use of insulin     ICD-10-CM: E11.9  ICD-9-CM: 250.00            Class 2 Severe Obesity (BMI >=35 and <=39.9). Obesity-related health conditions include the following: diabetes mellitus. Obesity is improving with treatment. BMI is is above average; BMI management plan is completed. We discussed low calorie, low carb based diet program, portion control and increasing exercise.      An After Visit Summary and PPPS were given to the patient.

## 2023-05-04 NOTE — TELEPHONE ENCOUNTER
Caller: Bob Rebollar    Relationship to patient: Self    Best call back number: [phone]    Patient is needing:   PATIENT IS NEEDING A BLOOD WORK ORDER FOR THIS WEEK SO HE CAN BE READY FOR HIS RESULTS ON HIS APPOINTMENT ON MAY 12 AT 2:45P.M.    HE NEEDS TO GO TO LAB [facility] THAT OPEN ON SATURDAYS

## 2023-05-07 LAB
ALBUMIN SERPL-MCNC: 4.7 G/DL (ref 3.8–4.9)
ALBUMIN/GLOB SERPL: 2 {RATIO} (ref 1.2–2.2)
ALP SERPL-CCNC: 76 IU/L (ref 44–121)
ALT SERPL-CCNC: 21 IU/L (ref 0–44)
AST SERPL-CCNC: 23 IU/L (ref 0–40)
BILIRUB SERPL-MCNC: 1.2 MG/DL (ref 0–1.2)
BUN SERPL-MCNC: 17 MG/DL (ref 6–24)
BUN/CREAT SERPL: 18 (ref 9–20)
CALCIUM SERPL-MCNC: 9.7 MG/DL (ref 8.7–10.2)
CHLORIDE SERPL-SCNC: 102 MMOL/L (ref 96–106)
CHOLEST SERPL-MCNC: 209 MG/DL (ref 100–199)
CHOLEST/HDLC SERPL: 4.8 RATIO (ref 0–5)
CO2 SERPL-SCNC: 23 MMOL/L (ref 20–29)
CREAT SERPL-MCNC: 0.94 MG/DL (ref 0.76–1.27)
EGFRCR SERPLBLD CKD-EPI 2021: 98 ML/MIN/1.73
GLOBULIN SER CALC-MCNC: 2.4 G/DL (ref 1.5–4.5)
GLUCOSE SERPL-MCNC: 153 MG/DL (ref 70–99)
HBA1C MFR BLD: 8.6 % (ref 4.8–5.6)
HDLC SERPL-MCNC: 44 MG/DL
LDLC SERPL CALC-MCNC: 122 MG/DL (ref 0–99)
POTASSIUM SERPL-SCNC: 4.6 MMOL/L (ref 3.5–5.2)
PROT SERPL-MCNC: 7.1 G/DL (ref 6–8.5)
SODIUM SERPL-SCNC: 140 MMOL/L (ref 134–144)
TRIGL SERPL-MCNC: 243 MG/DL (ref 0–149)
VLDLC SERPL CALC-MCNC: 43 MG/DL (ref 5–40)

## 2023-05-12 ENCOUNTER — OFFICE VISIT (OUTPATIENT)
Dept: FAMILY MEDICINE CLINIC | Facility: CLINIC | Age: 53
End: 2023-05-12
Payer: COMMERCIAL

## 2023-05-12 VITALS
HEART RATE: 99 BPM | SYSTOLIC BLOOD PRESSURE: 130 MMHG | HEIGHT: 67 IN | DIASTOLIC BLOOD PRESSURE: 84 MMHG | WEIGHT: 221 LBS | BODY MASS INDEX: 34.69 KG/M2 | OXYGEN SATURATION: 100 % | RESPIRATION RATE: 18 BRPM | TEMPERATURE: 97.1 F

## 2023-05-12 DIAGNOSIS — E11.9 TYPE 2 DIABETES MELLITUS WITHOUT COMPLICATION, WITHOUT LONG-TERM CURRENT USE OF INSULIN: ICD-10-CM

## 2023-05-12 DIAGNOSIS — E78.2 MIXED HYPERLIPIDEMIA: ICD-10-CM

## 2023-05-12 DIAGNOSIS — E11.9 TYPE 2 DIABETES MELLITUS WITHOUT COMPLICATION, WITHOUT LONG-TERM CURRENT USE OF INSULIN: Primary | ICD-10-CM

## 2023-05-12 DIAGNOSIS — M1A.09X0 IDIOPATHIC CHRONIC GOUT OF MULTIPLE SITES WITHOUT TOPHUS: ICD-10-CM

## 2023-05-12 DIAGNOSIS — Z87.891 HISTORY OF TOBACCO USE: ICD-10-CM

## 2023-05-12 DIAGNOSIS — I10 ESSENTIAL HYPERTENSION: ICD-10-CM

## 2023-05-12 DIAGNOSIS — E66.09 CLASS 1 OBESITY DUE TO EXCESS CALORIES WITH SERIOUS COMORBIDITY AND BODY MASS INDEX (BMI) OF 34.0 TO 34.9 IN ADULT: ICD-10-CM

## 2023-05-12 LAB
BILIRUB BLD-MCNC: NEGATIVE MG/DL
CLARITY, POC: CLEAR
COLOR UR: YELLOW
GLUCOSE UR STRIP-MCNC: NEGATIVE MG/DL
KETONES UR QL: NEGATIVE
LEUKOCYTE EST, POC: NEGATIVE
NITRITE UR-MCNC: NEGATIVE MG/ML
PH UR: 6.5 [PH] (ref 5–8)
PROT UR STRIP-MCNC: NEGATIVE MG/DL
RBC # UR STRIP: NEGATIVE /UL
SP GR UR: 1.01 (ref 1–1.03)
UROBILINOGEN UR QL: NORMAL

## 2023-05-12 RX ORDER — LORATADINE 10 MG/1
10 TABLET ORAL DAILY
COMMUNITY

## 2023-05-12 RX ORDER — COLCHICINE 0.6 MG/1
0.6 TABLET ORAL 2 TIMES DAILY PRN
Qty: 90 TABLET | Refills: 0 | Status: SHIPPED | OUTPATIENT
Start: 2023-05-12

## 2023-05-12 NOTE — ASSESSMENT & PLAN NOTE
Patient's (Body mass index is 34.61 kg/m².) indicates that they are obese (BMI >30) with health conditions that include hypertension, diabetes mellitus and dyslipidemias . Weight is improving with lifestyle modifications. BMI  is above average; BMI management plan is completed. We discussed portion control and increasing exercise.

## 2023-05-12 NOTE — LETTER
May 12, 2023     Patient: Bob Rebollar   YOB: 1970   Date of Visit: 5/12/2023       To Whom It May Concern:    Bob Rebollar was seen in my clinic today. Please excuse his absence on 05/12/2023.           Sincerely,        Anisha Carmona MD    CC: No Recipients

## 2023-06-08 DIAGNOSIS — E78.2 MIXED HYPERLIPIDEMIA: ICD-10-CM

## 2023-06-08 RX ORDER — ROSUVASTATIN CALCIUM 10 MG/1
TABLET, COATED ORAL
Qty: 90 TABLET | Refills: 3 | Status: SHIPPED | OUTPATIENT
Start: 2023-06-08

## 2023-08-18 ENCOUNTER — TELEPHONE (OUTPATIENT)
Dept: FAMILY MEDICINE CLINIC | Facility: CLINIC | Age: 53
End: 2023-08-18
Payer: COMMERCIAL

## 2023-08-18 DIAGNOSIS — E78.2 MIXED HYPERLIPIDEMIA: ICD-10-CM

## 2023-08-18 DIAGNOSIS — E11.9 TYPE 2 DIABETES MELLITUS WITHOUT COMPLICATION, WITHOUT LONG-TERM CURRENT USE OF INSULIN: Primary | ICD-10-CM

## 2023-08-18 NOTE — PROGRESS NOTES
Subjective   Bob Rebollar is a 52 y.o. male. Presents to [facility]    Chief Complaint   Patient presents with    Diabetes    Hypertension    Hyperlipidemia       Diabetes  He presents for his follow-up diabetic visit. He has type 2 diabetes mellitus. Pertinent negatives for hypoglycemia include no headaches or sweats. Pertinent negatives for diabetes include no chest pain and no weakness. There are no hypoglycemic complications. There are no diabetic complications. Risk factors for coronary artery disease include diabetes mellitus, dyslipidemia, hypertension, male sex, obesity and family history. He is following a generally unhealthy diet. When asked about meal planning, he reported none. He participates in exercise intermittently. His overall blood glucose range is 140-180 mg/dl. An ACE inhibitor/angiotensin II receptor blocker is not being taken. He does not see a podiatrist.Eye exam is current (08/16/23 Dr. Francis).   Hypertension  This is a chronic problem. The current episode started more than 1 year ago. The problem is controlled. Pertinent negatives include no chest pain, headaches, malaise/fatigue, palpitations, shortness of breath or sweats. Risk factors for coronary artery disease include dyslipidemia, diabetes mellitus, obesity, male gender and family history. Current antihypertension treatment includes nothing. The current treatment provides mild improvement.   Hyperlipidemia  This is a chronic problem. The current episode started more than 1 year ago. Exacerbating diseases include diabetes and obesity. Pertinent negatives include no chest pain or shortness of breath. Current antihyperlipidemic treatment includes statins. The current treatment provides moderate improvement of lipids. Risk factors for coronary artery disease include diabetes mellitus, dyslipidemia, family history, male sex, hypertension and obesity.      I personally reviewed and updated the patient's allergies,  medications, problem list, and past medical, surgical, social, and family history. I have reviewed and confirmed the accuracy of the History of Present Illness and Review of Symptoms as documented by the MA/LPN/RN. Anisha Carmona MD    Allergies:  No Known Allergies    Social History:  Social History     Socioeconomic History    Marital status:    Tobacco Use    Smoking status: Former     Packs/day: 1.00     Years: 8.00     Pack years: 8.00     Types: Cigarettes     Quit date:      Years since quittin.6    Smokeless tobacco: Never   Substance and Sexual Activity    Alcohol use: No    Drug use: Never    Sexual activity: Defer       Family History:  Family History   Problem Relation Age of Onset    Diabetes Mother         diabetes mellitus    Heart disease Mother         cardiovascular disease, ischemic    Diabetes Brother         diabetes mellitus       Past Medical History :  Patient Active Problem List   Diagnosis    Allergic rhinitis    History of tobacco use    Polyarthropathy or polyarthritis    Thoracic disc herniation    Essential hypertension    SI (sacroiliac) joint dysfunction    Gastroesophageal reflux disease without esophagitis    Polyphagia    Type 2 diabetes mellitus without complication, without long-term current use of insulin    Annual visit for general adult medical examination with abnormal findings    Screening PSA (prostate specific antigen)    Mixed hyperlipidemia    Myopia    Regular astigmatism    Presbyopia    Class 2 severe obesity due to excess calories with serious comorbidity and body mass index (BMI) of 35.0 to 35.9 in adult    Idiopathic chronic gout of multiple sites without tophus       Medication List:    Current Outpatient Medications:     Accu-Chek FastClix Lancets misc, 1 each by Other route Daily., Disp: 100 each, Rfl: 6    acetaminophen (TYLENOL) 500 MG tablet, Take 1 tablet by mouth Every 6 (Six) Hours As Needed for Mild Pain., Disp: , Rfl:     albuterol  (PROVENTIL) (2.5 MG/3ML) 0.083% nebulizer solution, Take 2.5 mg by nebulization Every 4 (Four) Hours As Needed for Wheezing., Disp: 90 vial, Rfl: 0    albuterol sulfate  (90 Base) MCG/ACT inhaler, Inhale 2 puffs Every 4 (Four) Hours As Needed for Wheezing., Disp: 6.7 g, Rfl: 6    Blood Glucose Monitoring Suppl (Accu-Chek Guide) w/Device kit, 1 each Daily., Disp: 1 kit, Rfl: 0    calcium carbonate (TUMS) 500 MG chewable tablet, Chew 1 tablet As Needed for Indigestion or Heartburn., Disp: , Rfl:     colchicine 0.6 MG tablet, Take 1 tablet by mouth 2 (Two) Times a Day As Needed (gout flare)., Disp: 90 tablet, Rfl: 0    glucose blood (Accu-Chek Guide) test strip, 1 each by Other route Daily. Use as instructed, Disp: 50 each, Rfl: 12    ibuprofen (ADVIL,MOTRIN) 200 MG tablet, Take 1 tablet by mouth Every 6 (Six) Hours As Needed for Mild Pain., Disp: , Rfl:     ipratropium (ATROVENT) 0.02 % nebulizer solution, Take 2.5 mL by nebulization Every 8 (Eight) Hours., Disp: 100 mL, Rfl: 12    Jardiance 25 MG tablet tablet, TAKE 1 TABLET BY MOUTH DAILY, Disp: 30 tablet, Rfl: 1    lisinopril (PRINIVIL,ZESTRIL) 10 MG tablet, TAKE 1 TABLET BY MOUTH DAILY, Disp: 30 tablet, Rfl: 12    Menthol-Methyl Salicylate (BENGAY ARTHRITIS FORMULA EX), As Needed., Disp: , Rfl:     metFORMIN (GLUCOPHAGE) 1000 MG tablet, Take 1 tablet by mouth 2 (Two) Times a Day With Meals., Disp: 60 tablet, Rfl: 12    polyethylene glycol (MIRALAX) 17 g packet, Take 17 g by mouth As Needed., Disp: , Rfl:     rosuvastatin (CRESTOR) 20 MG tablet, Take 1 tablet by mouth Daily., Disp: 90 tablet, Rfl: 3    tiZANidine (ZANAFLEX) 4 MG tablet, Take 1 tablet by mouth At Night As Needed for Muscle Spasms., Disp: 30 tablet, Rfl: 0    vitamin D3 125 MCG (5000 UT) capsule capsule, Take 1 capsule by mouth Daily., Disp: , Rfl:     Past Surgical History:  No past surgical history on file.      Physical Exam:      Vital Signs:    Vitals:    08/22/23 1548   BP: 110/80  "  Pulse: 107   Resp: 18   Temp: 98.2 øF (36.8 øC)   SpO2: 98%        /80 (BP Location: Right arm, Patient Position: Sitting, Cuff Size: Adult)   Pulse 107   Temp 98.2 øF (36.8 øC) (Temporal)   Resp 18   Ht 170.2 cm (67\")   Wt 102 kg (224 lb)   SpO2 98% Comment: room air  BMI 35.08 kg/mý     Wt Readings from Last 3 Encounters:   08/22/23 102 kg (224 lb)   05/12/23 100 kg (221 lb)   03/23/23 103 kg (227 lb 12.8 oz)       Result Review :   The following data was reviewed by: Anisha Carmona MD on 08/22/2023:            Latest Reference Range & Units 08/19/23 07:52   Sodium 134 - 144 mmol/L 138   Potassium 3.5 - 5.2 mmol/L 4.4   Chloride 96 - 106 mmol/L 100   CO2 20 - 29 mmol/L 23   BUN 6 - 24 mg/dL 15   Creatinine 0.76 - 1.27 mg/dL 0.96   BUN/Creatinine Ratio 9 - 20  16   EGFR Result >59 mL/min/1.73 95   Glucose 70 - 99 mg/dL 143 (H)   Calcium 8.7 - 10.2 mg/dL 9.5   Alkaline Phosphatase 44 - 121 IU/L 78   Total Protein 6.0 - 8.5 g/dL 6.8   Albumin 3.8 - 4.9 g/dL 4.2   A/G Ratio 1.2 - 2.2  1.6   AST (SGOT) 0 - 40 IU/L 23   ALT (SGPT) 0 - 44 IU/L 27   Total Bilirubin 0.0 - 1.2 mg/dL 0.8   Hemoglobin A1C 4.8 - 5.6 % 7.8 (H)   Total Cholesterol 100 - 199 mg/dL 214 (H)   HDL Cholesterol >39 mg/dL 45   LDL Cholesterol  0 - 99 mg/dL 116 (H)   Triglycerides 0 - 149 mg/dL 303 (H)   Chol/HDL Ratio 0.0 - 5.0 ratio 4.8   VLDL Cholesterol Rolly 5 - 40 mg/dL 53 (H)   Globulin 1.5 - 4.5 g/dL 2.6   (H): Data is abnormally high    Physical Exam  Vitals reviewed.   Constitutional:       Appearance: Normal appearance. He is well-developed.   HENT:      Head: Normocephalic and atraumatic.   Eyes:      General:         Right eye: No discharge.         Left eye: No discharge.   Cardiovascular:      Rate and Rhythm: Normal rate and regular rhythm.      Heart sounds: Normal heart sounds. No murmur heard.    No friction rub. No gallop.   Pulmonary:      Effort: Pulmonary effort is normal. No respiratory distress.      Breath " sounds: Normal breath sounds. No wheezing or rales.   Skin:     General: Skin is warm and dry.      Findings: No rash.   Neurological:      Mental Status: He is alert and oriented to person, place, and time.      Coordination: Coordination normal.      Gait: Gait normal.   Psychiatric:         Behavior: Behavior is cooperative.       Assessment and Plan:  Problems Addressed this Visit          Allergies and Adverse Reactions    Allergic rhinitis     Worse  Start zyrtec  Diagnosis, treatment and and course discussed. Potential side effects discussed. Return if there is worsening or persistence of symptoms.               Cardiac and Vasculature    Essential hypertension     Hypertension is improving with treatment.  Continue current treatment regimen.  Dietary sodium restriction.  Weight loss.  Blood pressure will be reassessed in 3 months.         Mixed hyperlipidemia     Lipid abnormalities are worsening.  Nutritional counseling was provided. and Pharmacotherapy as ordered.  Lipids will be reassessed in 3 months.         Relevant Medications    rosuvastatin (CRESTOR) 20 MG tablet       Endocrine and Metabolic    Type 2 diabetes mellitus without complication, without long-term current use of insulin - Primary     Diabetes is improving with treatment.   Continue current treatment regimen.  Reminded to bring in blood sugar diary at next visit.  Dietary recommendations for ADA diet.  Diabetes will be reassessed in 3 months.         Relevant Orders    POCT urinalysis dipstick, manual (Completed)    POCT Glucose (Completed)    Class 2 severe obesity due to excess calories with serious comorbidity and body mass index (BMI) of 35.0 to 35.9 in adult     Patient's (Body mass index is 35.08 kg/mý.) indicates that they are obese (BMI >30) with health conditions that include hypertension, diabetes mellitus, and dyslipidemias . Weight is worsening. BMI  is above average; BMI management plan is completed. We discussed portion  control and increasing exercise.             Tobacco    History of tobacco use     Diagnoses         Codes Comments    Type 2 diabetes mellitus without complication, without long-term current use of insulin    -  Primary ICD-10-CM: E11.9  ICD-9-CM: 250.00     Essential hypertension     ICD-10-CM: I10  ICD-9-CM: 401.9     Mixed hyperlipidemia     ICD-10-CM: E78.2  ICD-9-CM: 272.2     Class 2 severe obesity due to excess calories with serious comorbidity and body mass index (BMI) of 35.0 to 35.9 in adult     ICD-10-CM: E66.01, Z68.35  ICD-9-CM: 278.01, V85.35     History of tobacco use     ICD-10-CM: Z87.891  ICD-9-CM: V15.82     Seasonal allergic rhinitis due to pollen     ICD-10-CM: J30.1  ICD-9-CM: 477.0                     An After Visit Summary and PPPS were given to the patient.

## 2023-08-18 NOTE — TELEPHONE ENCOUNTER
[facility] BY TOMORROW  MORNING. HIS APPOINTMENT IS 8/22    CALL BACK NUMBER [phone]

## 2023-08-20 LAB
ALBUMIN SERPL-MCNC: 4.2 G/DL (ref 3.8–4.9)
ALBUMIN/GLOB SERPL: 1.6 {RATIO} (ref 1.2–2.2)
ALP SERPL-CCNC: 78 IU/L (ref 44–121)
ALT SERPL-CCNC: 27 IU/L (ref 0–44)
AST SERPL-CCNC: 23 IU/L (ref 0–40)
BILIRUB SERPL-MCNC: 0.8 MG/DL (ref 0–1.2)
BUN SERPL-MCNC: 15 MG/DL (ref 6–24)
BUN/CREAT SERPL: 16 (ref 9–20)
CALCIUM SERPL-MCNC: 9.5 MG/DL (ref 8.7–10.2)
CHLORIDE SERPL-SCNC: 100 MMOL/L (ref 96–106)
CHOLEST SERPL-MCNC: 214 MG/DL (ref 100–199)
CHOLEST/HDLC SERPL: 4.8 RATIO (ref 0–5)
CO2 SERPL-SCNC: 23 MMOL/L (ref 20–29)
CREAT SERPL-MCNC: 0.96 MG/DL (ref 0.76–1.27)
EGFRCR SERPLBLD CKD-EPI 2021: 95 ML/MIN/1.73
GLOBULIN SER CALC-MCNC: 2.6 G/DL (ref 1.5–4.5)
GLUCOSE SERPL-MCNC: 143 MG/DL (ref 70–99)
HBA1C MFR BLD: 7.8 % (ref 4.8–5.6)
HDLC SERPL-MCNC: 45 MG/DL
LDLC SERPL CALC-MCNC: 116 MG/DL (ref 0–99)
POTASSIUM SERPL-SCNC: 4.4 MMOL/L (ref 3.5–5.2)
PROT SERPL-MCNC: 6.8 G/DL (ref 6–8.5)
SODIUM SERPL-SCNC: 138 MMOL/L (ref 134–144)
TRIGL SERPL-MCNC: 303 MG/DL (ref 0–149)
VLDLC SERPL CALC-MCNC: 53 MG/DL (ref 5–40)

## 2023-08-22 ENCOUNTER — OFFICE VISIT (OUTPATIENT)
Dept: FAMILY MEDICINE CLINIC | Facility: CLINIC | Age: 53
End: 2023-08-22
Payer: COMMERCIAL

## 2023-08-22 VITALS
HEART RATE: 107 BPM | TEMPERATURE: 98.2 F | WEIGHT: 224 LBS | OXYGEN SATURATION: 98 % | SYSTOLIC BLOOD PRESSURE: 110 MMHG | RESPIRATION RATE: 18 BRPM | DIASTOLIC BLOOD PRESSURE: 80 MMHG | BODY MASS INDEX: 35.16 KG/M2 | HEIGHT: 67 IN

## 2023-08-22 DIAGNOSIS — J30.1 SEASONAL ALLERGIC RHINITIS DUE TO POLLEN: ICD-10-CM

## 2023-08-22 DIAGNOSIS — I10 ESSENTIAL HYPERTENSION: ICD-10-CM

## 2023-08-22 DIAGNOSIS — E11.9 TYPE 2 DIABETES MELLITUS WITHOUT COMPLICATION, WITHOUT LONG-TERM CURRENT USE OF INSULIN: Primary | ICD-10-CM

## 2023-08-22 DIAGNOSIS — E78.2 MIXED HYPERLIPIDEMIA: ICD-10-CM

## 2023-08-22 DIAGNOSIS — Z87.891 HISTORY OF TOBACCO USE: ICD-10-CM

## 2023-08-22 DIAGNOSIS — E66.01 CLASS 2 SEVERE OBESITY DUE TO EXCESS CALORIES WITH SERIOUS COMORBIDITY AND BODY MASS INDEX (BMI) OF 35.0 TO 35.9 IN ADULT: ICD-10-CM

## 2023-08-22 PROBLEM — R05.1 ACUTE COUGH: Status: RESOLVED | Noted: 2023-03-23 | Resolved: 2023-08-22

## 2023-08-22 LAB
BILIRUB BLD-MCNC: NEGATIVE MG/DL
CLARITY, POC: CLEAR
COLOR UR: YELLOW
GLUCOSE BLDC GLUCOMTR-MCNC: 156 MG/DL (ref 70–130)
GLUCOSE UR STRIP-MCNC: ABNORMAL MG/DL
KETONES UR QL: NEGATIVE
LEUKOCYTE EST, POC: NEGATIVE
NITRITE UR-MCNC: NEGATIVE MG/ML
PH UR: 6 [PH] (ref 5–8)
PROT UR STRIP-MCNC: NEGATIVE MG/DL
RBC # UR STRIP: NEGATIVE /UL
SP GR UR: 1.01 (ref 1–1.03)
UROBILINOGEN UR QL: NORMAL

## 2023-08-22 RX ORDER — ROSUVASTATIN CALCIUM 20 MG/1
20 TABLET, COATED ORAL DAILY
Qty: 90 TABLET | Refills: 3 | Status: SHIPPED | OUTPATIENT
Start: 2023-08-22

## 2023-08-22 NOTE — ASSESSMENT & PLAN NOTE
Patient's (Body mass index is 35.08 kg/mý.) indicates that they are obese (BMI >30) with health conditions that include hypertension, diabetes mellitus, and dyslipidemias . Weight is worsening. BMI  is above average; BMI management plan is completed. We discussed portion control and increasing exercise.

## 2023-08-22 NOTE — ASSESSMENT & PLAN NOTE
Worse  Start zyrtec  Diagnosis, treatment and and course discussed. Potential side effects discussed. Return if there is worsening or persistence of symptoms.

## 2023-09-01 DIAGNOSIS — E11.9 TYPE 2 DIABETES MELLITUS WITHOUT COMPLICATION, WITHOUT LONG-TERM CURRENT USE OF INSULIN: ICD-10-CM

## 2023-09-01 RX ORDER — EMPAGLIFLOZIN 25 MG/1
TABLET, FILM COATED ORAL
Qty: 30 TABLET | Refills: 12 | Status: SHIPPED | OUTPATIENT
Start: 2023-09-01

## 2023-11-28 ENCOUNTER — TELEPHONE (OUTPATIENT)
Dept: FAMILY MEDICINE CLINIC | Facility: CLINIC | Age: 53
End: 2023-11-28

## 2023-11-28 DIAGNOSIS — E11.9 TYPE 2 DIABETES MELLITUS WITHOUT COMPLICATION, WITHOUT LONG-TERM CURRENT USE OF INSULIN: Primary | ICD-10-CM

## 2023-11-28 DIAGNOSIS — E78.2 MIXED HYPERLIPIDEMIA: ICD-10-CM

## 2023-11-28 NOTE — TELEPHONE ENCOUNTER
Caller: Bob Rebollar    Relationship: Self    Best call back number:     [phone]       What orders are you requesting (i.e. lab or imaging): BLOOD WORK    In what timeframe would the patient need to come in: AS SOON AS POSSIBLE    Where will you receive your lab/imaging services: IN OFFICE    Additional notes:

## 2023-12-01 ENCOUNTER — CLINICAL SUPPORT (OUTPATIENT)
Dept: FAMILY MEDICINE CLINIC | Facility: CLINIC | Age: 53
End: 2023-12-01
Payer: COMMERCIAL

## 2023-12-01 DIAGNOSIS — E11.9 TYPE 2 DIABETES MELLITUS WITHOUT COMPLICATION, WITHOUT LONG-TERM CURRENT USE OF INSULIN: Primary | ICD-10-CM

## 2023-12-01 DIAGNOSIS — E78.2 MIXED HYPERLIPIDEMIA: ICD-10-CM

## 2023-12-02 LAB
ALBUMIN SERPL-MCNC: 4.4 G/DL (ref 3.8–4.9)
ALBUMIN/GLOB SERPL: 1.7 {RATIO} (ref 1.2–2.2)
ALP SERPL-CCNC: 91 IU/L (ref 44–121)
ALT SERPL-CCNC: 27 IU/L (ref 0–44)
AST SERPL-CCNC: 23 IU/L (ref 0–40)
BILIRUB SERPL-MCNC: 1.6 MG/DL (ref 0–1.2)
BUN SERPL-MCNC: 19 MG/DL (ref 6–24)
BUN/CREAT SERPL: 20 (ref 9–20)
CALCIUM SERPL-MCNC: 9.4 MG/DL (ref 8.7–10.2)
CHLORIDE SERPL-SCNC: 98 MMOL/L (ref 96–106)
CHOLEST SERPL-MCNC: 164 MG/DL (ref 100–199)
CHOLEST/HDLC SERPL: 3.6 RATIO (ref 0–5)
CO2 SERPL-SCNC: 19 MMOL/L (ref 20–29)
CREAT SERPL-MCNC: 0.97 MG/DL (ref 0.76–1.27)
EGFRCR SERPLBLD CKD-EPI 2021: 93 ML/MIN/1.73
GLOBULIN SER CALC-MCNC: 2.6 G/DL (ref 1.5–4.5)
GLUCOSE SERPL-MCNC: 130 MG/DL (ref 70–99)
HBA1C MFR BLD: 9.3 % (ref 4.8–5.6)
HDLC SERPL-MCNC: 46 MG/DL
LDLC SERPL CALC-MCNC: 83 MG/DL (ref 0–99)
POTASSIUM SERPL-SCNC: 4.5 MMOL/L (ref 3.5–5.2)
PROT SERPL-MCNC: 7 G/DL (ref 6–8.5)
SODIUM SERPL-SCNC: 135 MMOL/L (ref 134–144)
TRIGL SERPL-MCNC: 207 MG/DL (ref 0–149)
VLDLC SERPL CALC-MCNC: 35 MG/DL (ref 5–40)

## 2023-12-04 NOTE — PROGRESS NOTES
Subjective   Bob Rebollar is a 53 y.o. male. Presents to [facility]    Chief Complaint   Patient presents with    Hypertension    Hyperlipidemia    Diabetes    Cough       Hypertension  This is a chronic problem. The current episode started more than 1 year ago. The problem has been waxing and waning since onset. The problem is controlled. Associated symptoms include malaise/fatigue. Pertinent negatives include no blurred vision, chest pain, headaches, palpitations, shortness of breath or sweats. There are no associated agents to hypertension. Risk factors for coronary artery disease include dyslipidemia, diabetes mellitus, obesity and male gender. Past treatments include nothing. Current antihypertension treatment includes ACE inhibitors. The current treatment provides moderate improvement. There are no compliance problems.    Hyperlipidemia  This is a chronic problem. The current episode started more than 1 year ago. The problem is controlled. Exacerbating diseases include diabetes and obesity. He has no history of hypothyroidism. Pertinent negatives include no chest pain, leg pain or shortness of breath. Current antihyperlipidemic treatment includes statins. The current treatment provides moderate improvement of lipids. There are no compliance problems.  Risk factors for coronary artery disease include male sex, obesity, dyslipidemia, diabetes mellitus, hypertension and stress.   Diabetes  He presents for his follow-up diabetic visit. He has type 2 diabetes mellitus. There are no hypoglycemic associated symptoms. Pertinent negatives for hypoglycemia include no headaches or sweats. Pertinent negatives for diabetes include no blurred vision and no chest pain. There are no hypoglycemic complications. There are no diabetic complications. Risk factors for coronary artery disease include male sex, hypertension, diabetes mellitus, dyslipidemia, obesity and stress. Current diabetic treatment includes  oral agent (dual therapy). His weight is fluctuating minimally. He is following a generally unhealthy diet. When asked about meal planning, he reported none. He has not had a previous visit with a dietitian. He rarely participates in exercise. His home blood glucose trend is fluctuating minimally. His overall blood glucose range is >200 mg/dl. An ACE inhibitor/angiotensin II receptor blocker is being taken. He does not see a podiatrist.Eye exam is current.   Cough  This is a new problem. The current episode started 1 to 4 weeks ago. The problem has been gradually worsening. The problem occurs every few minutes. The cough is Non-productive. Pertinent negatives include no chest pain, chills, ear congestion, ear pain, fever, headaches, nasal congestion, rash, shortness of breath or sweats. The symptoms are aggravated by lying down (cold air, transition from being up to laying). He has tried OTC inhaler for the symptoms.        I personally reviewed and updated the patient's allergies, medications, problem list, and past medical, surgical, social, and family history. I have reviewed and confirmed the accuracy of the History of Present Illness and Review of Symptoms as documented by the MA/LPN/RN. Anisha Carmona MD    Allergies:  No Known Allergies    Social History:  Social History     Socioeconomic History    Marital status:    Tobacco Use    Smoking status: Former     Packs/day: 1.00     Years: 8.00     Additional pack years: 0.00     Total pack years: 8.00     Types: Cigarettes     Quit date:      Years since quittin.9    Smokeless tobacco: Never   Substance and Sexual Activity    Alcohol use: No    Drug use: Never    Sexual activity: Defer       Family History:  Family History   Problem Relation Age of Onset    Diabetes Mother         diabetes mellitus    Heart disease Mother         cardiovascular disease, ischemic    Diabetes Brother         diabetes mellitus       Past Medical History :  Patient  Active Problem List   Diagnosis    Allergic rhinitis    History of tobacco use    Polyarthropathy or polyarthritis    Thoracic disc herniation    Essential hypertension    SI (sacroiliac) joint dysfunction    Gastroesophageal reflux disease without esophagitis    Polyphagia    Type 2 diabetes mellitus without complication, without long-term current use of insulin    Annual visit for general adult medical examination with abnormal findings    Screening PSA (prostate specific antigen)    Mixed hyperlipidemia    Myopia    Regular astigmatism    Presbyopia    Class 2 severe obesity due to excess calories with serious comorbidity and body mass index (BMI) of 35.0 to 35.9 in adult    Idiopathic chronic gout of multiple sites without tophus    Screening for colorectal cancer       Medication List:    Current Outpatient Medications:     Accu-Chek FastClix Lancets misc, 1 each by Other route Daily., Disp: 100 each, Rfl: 6    acetaminophen (TYLENOL) 500 MG tablet, Take 1 tablet by mouth Every 6 (Six) Hours As Needed for Mild Pain., Disp: , Rfl:     albuterol (PROVENTIL) (2.5 MG/3ML) 0.083% nebulizer solution, Take 2.5 mg by nebulization Every 4 (Four) Hours As Needed for Wheezing., Disp: 90 vial, Rfl: 0    albuterol sulfate  (90 Base) MCG/ACT inhaler, Inhale 2 puffs Every 4 (Four) Hours As Needed for Wheezing., Disp: 6.7 g, Rfl: 6    Blood Glucose Monitoring Suppl (Accu-Chek Guide) w/Device kit, 1 each Daily., Disp: 1 kit, Rfl: 0    calcium carbonate (TUMS) 500 MG chewable tablet, Chew 1 tablet As Needed for Indigestion or Heartburn., Disp: , Rfl:     colchicine 0.6 MG tablet, Take 1 tablet by mouth 2 (Two) Times a Day As Needed (gout flare)., Disp: 90 tablet, Rfl: 0    empagliflozin (Jardiance) 25 MG tablet tablet, TAKE 1 TABLET BY MOUTH DAILY, Disp: 30 tablet, Rfl: 12    glucose blood (Accu-Chek Guide) test strip, 1 each by Other route Daily. Use as instructed, Disp: 50 each, Rfl: 12    ibuprofen (ADVIL,MOTRIN) 200  "MG tablet, Take 1 tablet by mouth Every 6 (Six) Hours As Needed for Mild Pain., Disp: , Rfl:     ipratropium (ATROVENT) 0.02 % nebulizer solution, Take 2.5 mL by nebulization Every 8 (Eight) Hours., Disp: 100 mL, Rfl: 12    lisinopril (PRINIVIL,ZESTRIL) 10 MG tablet, TAKE 1 TABLET BY MOUTH DAILY, Disp: 30 tablet, Rfl: 12    metFORMIN (GLUCOPHAGE) 1000 MG tablet, Take 1 tablet by mouth 2 (Two) Times a Day With Meals., Disp: 60 tablet, Rfl: 12    rosuvastatin (CRESTOR) 20 MG tablet, Take 1 tablet by mouth Daily., Disp: 90 tablet, Rfl: 3    vitamin D3 125 MCG (5000 UT) capsule capsule, Take 1 capsule by mouth Daily., Disp: , Rfl:     Semaglutide 3 MG tablet, Take 1 tablet by mouth Daily., Disp: 30 tablet, Rfl: 0    Past Surgical History:  No past surgical history on file.      Physical Exam:      Vital Signs:    Vitals:    12/05/23 0753   BP: 118/82   Pulse: 93   Resp: 18   Temp: 97.1 °F (36.2 °C)   SpO2: 98%        /82   Pulse 93   Temp 97.1 °F (36.2 °C) (Temporal)   Resp 18   Ht 170.2 cm (67\")   Wt 103 kg (226 lb 6.4 oz)   SpO2 98%   BMI 35.46 kg/m²     Wt Readings from Last 3 Encounters:   12/05/23 103 kg (226 lb 6.4 oz)   08/22/23 102 kg (224 lb)   05/12/23 100 kg (221 lb)       Result Review :   The following data was reviewed by: Anisha Carmona MD on 12/05/2023:  A1C Last 3 Results          5/6/2023    08:26 8/19/2023    07:52 12/1/2023    08:13   HGBA1C Last 3 Results   Hemoglobin A1C 8.6  7.8  9.3               Physical Exam  Vitals reviewed.   Constitutional:       Appearance: Normal appearance. He is well-developed.   HENT:      Head: Normocephalic and atraumatic.      Right Ear: Tympanic membrane and external ear normal. No decreased hearing noted. No tenderness. No middle ear effusion. Tympanic membrane is not injected, erythematous, retracted or bulging.      Left Ear: Tympanic membrane and external ear normal. No decreased hearing noted. No tenderness.  No middle ear effusion. Tympanic " membrane is not injected, erythematous, retracted or bulging.      Nose: Nose normal. No rhinorrhea.      Mouth/Throat:      Pharynx: No oropharyngeal exudate or posterior oropharyngeal erythema.   Eyes:      General:         Right eye: No discharge.         Left eye: No discharge.   Cardiovascular:      Rate and Rhythm: Normal rate and regular rhythm.      Heart sounds: Normal heart sounds. No murmur heard.     No friction rub. No gallop.   Pulmonary:      Effort: Pulmonary effort is normal. No respiratory distress.      Breath sounds: Normal breath sounds. No wheezing or rales.   Abdominal:      General: Abdomen is flat. There is no distension.      Palpations: Abdomen is soft. There is no mass.      Tenderness: There is no abdominal tenderness. There is no guarding or rebound.      Hernia: No hernia is present.   Lymphadenopathy:      Cervical: No cervical adenopathy.   Skin:     General: Skin is warm and dry.      Findings: No rash.   Neurological:      Mental Status: He is alert and oriented to person, place, and time.      Coordination: Coordination normal.      Gait: Gait normal.   Psychiatric:         Behavior: Behavior is cooperative.         Assessment and Plan:  Problems Addressed this Visit          Cardiac and Vasculature    Essential hypertension     Hypertension is unchanged.  Continue current treatment regimen.  Dietary sodium restriction.  Weight loss.  Blood pressure will be reassessed in 3 months.         Mixed hyperlipidemia       Endocrine and Metabolic    Type 2 diabetes mellitus without complication, without long-term current use of insulin - Primary     Diabetes is worsening.   Reminded to bring in blood sugar diary at next visit.  Dietary recommendations for ADA diet.  Medication changes per orders.  Diabetes will be reassessed in 3 months.         Relevant Medications    Semaglutide 3 MG tablet    Other Relevant Orders    POCT urinalysis dipstick, multipro (Completed)    Class 2 severe  obesity due to excess calories with serious comorbidity and body mass index (BMI) of 35.0 to 35.9 in adult       Gastrointestinal Abdominal     Gastroesophageal reflux disease without esophagitis     Worsening with the cough.   Limit alcohol, caffeine, chocalate, citrus fruits, recumbency after meals and large portions. Discussed OTC famotidine     Stop soda, sweet tea, crush, big red, coke.          Screening for colorectal cancer     Given [place] folder for new patient paperwork for [facility]            Tobacco    History of tobacco use     Diagnoses         Codes Comments    Type 2 diabetes mellitus without complication, without long-term current use of insulin    -  Primary ICD-10-CM: E11.9  ICD-9-CM: 250.00     Mixed hyperlipidemia     ICD-10-CM: E78.2  ICD-9-CM: 272.2     Essential hypertension     ICD-10-CM: I10  ICD-9-CM: 401.9     Class 2 severe obesity due to excess calories with serious comorbidity and body mass index (BMI) of 35.0 to 35.9 in adult     ICD-10-CM: E66.01, Z68.35  ICD-9-CM: 278.01, V85.35     History of tobacco use     ICD-10-CM: Z87.891  ICD-9-CM: V15.82     Screening for colorectal cancer     ICD-10-CM: Z12.11, Z12.12  ICD-9-CM: V76.51, V76.41     Gastroesophageal reflux disease without esophagitis     ICD-10-CM: K21.9  ICD-9-CM: 530.81                     An After Visit Summary and PPPS were given to the patient.

## 2023-12-05 ENCOUNTER — OFFICE VISIT (OUTPATIENT)
Dept: FAMILY MEDICINE CLINIC | Facility: CLINIC | Age: 53
End: 2023-12-05
Payer: COMMERCIAL

## 2023-12-05 VITALS
DIASTOLIC BLOOD PRESSURE: 82 MMHG | WEIGHT: 226.4 LBS | HEIGHT: 67 IN | HEART RATE: 93 BPM | SYSTOLIC BLOOD PRESSURE: 118 MMHG | BODY MASS INDEX: 35.53 KG/M2 | OXYGEN SATURATION: 98 % | TEMPERATURE: 97.1 F | RESPIRATION RATE: 18 BRPM

## 2023-12-05 DIAGNOSIS — K21.9 GASTROESOPHAGEAL REFLUX DISEASE WITHOUT ESOPHAGITIS: ICD-10-CM

## 2023-12-05 DIAGNOSIS — Z12.11 SCREENING FOR COLORECTAL CANCER: ICD-10-CM

## 2023-12-05 DIAGNOSIS — E78.2 MIXED HYPERLIPIDEMIA: ICD-10-CM

## 2023-12-05 DIAGNOSIS — E11.9 TYPE 2 DIABETES MELLITUS WITHOUT COMPLICATION, WITHOUT LONG-TERM CURRENT USE OF INSULIN: Primary | ICD-10-CM

## 2023-12-05 DIAGNOSIS — E66.01 CLASS 2 SEVERE OBESITY DUE TO EXCESS CALORIES WITH SERIOUS COMORBIDITY AND BODY MASS INDEX (BMI) OF 35.0 TO 35.9 IN ADULT: ICD-10-CM

## 2023-12-05 DIAGNOSIS — I10 ESSENTIAL HYPERTENSION: ICD-10-CM

## 2023-12-05 DIAGNOSIS — Z87.891 HISTORY OF TOBACCO USE: ICD-10-CM

## 2023-12-05 DIAGNOSIS — Z12.12 SCREENING FOR COLORECTAL CANCER: ICD-10-CM

## 2023-12-05 LAB
BILIRUB BLD-MCNC: NEGATIVE MG/DL
CLARITY, POC: CLEAR
COLOR UR: YELLOW
GLUCOSE UR STRIP-MCNC: ABNORMAL MG/DL
KETONES UR QL: NEGATIVE
LEUKOCYTE EST, POC: NEGATIVE
NITRITE UR-MCNC: NEGATIVE MG/ML
PH UR: 5 [PH] (ref 5–8)
PROT UR STRIP-MCNC: NEGATIVE MG/DL
RBC # UR STRIP: NEGATIVE /UL
SP GR UR: 1.01 (ref 1–1.03)
UROBILINOGEN UR QL: NORMAL

## 2023-12-05 RX ORDER — MONTELUKAST SODIUM 10 MG/1
TABLET ORAL
COMMUNITY
End: 2023-12-05

## 2023-12-05 NOTE — ASSESSMENT & PLAN NOTE
Worsening with the cough.   Limit alcohol, caffeine, chocalate, citrus fruits, recumbency after meals and large portions. Discussed OTC famotidine     Stop soda, sweet tea, crush, big red, coke.

## 2023-12-13 ENCOUNTER — TELEPHONE (OUTPATIENT)
Dept: FAMILY MEDICINE CLINIC | Facility: CLINIC | Age: 53
End: 2023-12-13

## 2023-12-14 DIAGNOSIS — U07.1 COVID-19 VIRUS DETECTED: Primary | ICD-10-CM

## 2023-12-14 NOTE — ASSESSMENT & PLAN NOTE
Diabetes is worsening.   Reminded to bring in blood sugar diary at next visit.  Dietary recommendations for ADA diet.  Medication changes per orders.  Diabetes will be reassessed in 3 months.

## 2023-12-14 NOTE — TELEPHONE ENCOUNTER
Caller: Bob Rebollar    Relationship to patient: Self    Best call back number: [phone]    Date of positive COVID19 test: 12/13    Date of possible COVID19 exposure: SUNDAY     COVID19 symptoms: HEADACHE, COUGH, BODY ACHE, NO ENERGY, NASAL CONGESTION    Date of initial quarantine: 12/13    Additional information or concerns: WOULD LIKE DR. RG'S ADVICE    What is the patients preferred pharmacy: [facility] #[id number] [address] AT [facility], [place] OF  & [facility] - [phone] [facility] [phone] FX       
Called and let pt know.   
Patient would like to try paxlovid   
Detail Level: Simple
Quality 137: Melanoma: Continuity Of Care - Recall System: Patient information entered into a recall system that includes: target date for the next exam specified AND a process to follow up with patients regarding missed or unscheduled appointments

## 2024-01-25 ENCOUNTER — TELEPHONE (OUTPATIENT)
Dept: FAMILY MEDICINE CLINIC | Facility: CLINIC | Age: 54
End: 2024-01-25
Payer: COMMERCIAL

## 2024-01-25 DIAGNOSIS — E11.9 TYPE 2 DIABETES MELLITUS WITHOUT COMPLICATION, WITHOUT LONG-TERM CURRENT USE OF INSULIN: ICD-10-CM

## 2024-01-25 NOTE — TELEPHONE ENCOUNTER
Caller: Bob Rebollar    Relationship: Self    Best call back number: [phone]     Which medication are you concerned about:     empagliflozin (Jardiance) 25 MG tablet tablet       Who prescribed you this medication: DR RG        What are your concerns: PATIENT IS CALLING TO STATE H IS INSURANCE WILL NOT LET HIM GET THE ABOVE MEDICATION THROUGH A LOCAL PHARMACY.  HE STATES HE SPOKE WITH HIS HR AND WAS TOLD THAT HE NEEDS TO BRING IN A 1 YEAR PRESCRIPTION FOR THE ABOVE MEDICATION AND SHE WILL SEND THE PRESCRIPTION TO A PHARMACY IN [place].   HE STATES HE WOULD LIKE TO GET THE PRESCRIPTION TODAY DUE TO HAVING 3 TABLETS REMAINING.  HE IS REQUESTING A CALL WHEN HE CAN  THE PRESCRIPTION     PLEASE ADVISE.

## 2024-01-29 DIAGNOSIS — E11.9 TYPE 2 DIABETES MELLITUS WITHOUT COMPLICATION, WITHOUT LONG-TERM CURRENT USE OF INSULIN: ICD-10-CM

## 2024-04-08 ENCOUNTER — OFFICE VISIT (OUTPATIENT)
Dept: FAMILY MEDICINE CLINIC | Facility: CLINIC | Age: 54
End: 2024-04-08
Payer: COMMERCIAL

## 2024-04-08 VITALS
OXYGEN SATURATION: 100 % | HEIGHT: 66 IN | TEMPERATURE: 97.8 F | BODY MASS INDEX: 36.38 KG/M2 | DIASTOLIC BLOOD PRESSURE: 66 MMHG | SYSTOLIC BLOOD PRESSURE: 138 MMHG | WEIGHT: 226.4 LBS | HEART RATE: 106 BPM | RESPIRATION RATE: 18 BRPM

## 2024-04-08 DIAGNOSIS — R51.9 ACUTE NONINTRACTABLE HEADACHE, UNSPECIFIED HEADACHE TYPE: ICD-10-CM

## 2024-04-08 DIAGNOSIS — E11.9 TYPE 2 DIABETES MELLITUS WITHOUT COMPLICATION, WITHOUT LONG-TERM CURRENT USE OF INSULIN: ICD-10-CM

## 2024-04-08 DIAGNOSIS — J40 BRONCHITIS: ICD-10-CM

## 2024-04-08 DIAGNOSIS — R09.89 CHEST CONGESTION: ICD-10-CM

## 2024-04-08 DIAGNOSIS — J02.9 SORE THROAT: ICD-10-CM

## 2024-04-08 DIAGNOSIS — J30.2 SEASONAL ALLERGIC RHINITIS, UNSPECIFIED TRIGGER: ICD-10-CM

## 2024-04-08 DIAGNOSIS — R09.81 SINUS CONGESTION: ICD-10-CM

## 2024-04-08 DIAGNOSIS — Z87.891 HISTORY OF TOBACCO USE: ICD-10-CM

## 2024-04-08 DIAGNOSIS — R05.1 ACUTE COUGH: Primary | ICD-10-CM

## 2024-04-08 LAB
EXPIRATION DATE: NORMAL
EXPIRATION DATE: NORMAL
FLUAV AG UPPER RESP QL IA.RAPID: NOT DETECTED
FLUBV AG UPPER RESP QL IA.RAPID: NOT DETECTED
INTERNAL CONTROL: NORMAL
INTERNAL CONTROL: NORMAL
Lab: NORMAL
Lab: NORMAL
S PYO AG THROAT QL: NEGATIVE
SARS-COV-2 AG UPPER RESP QL IA.RAPID: NOT DETECTED

## 2024-04-08 PROCEDURE — 87428 SARSCOV & INF VIR A&B AG IA: CPT | Performed by: FAMILY MEDICINE

## 2024-04-08 PROCEDURE — 87880 STREP A ASSAY W/OPTIC: CPT | Performed by: FAMILY MEDICINE

## 2024-04-08 PROCEDURE — 99214 OFFICE O/P EST MOD 30 MIN: CPT | Performed by: FAMILY MEDICINE

## 2024-04-08 RX ORDER — AZITHROMYCIN 250 MG/1
TABLET, FILM COATED ORAL
Qty: 6 TABLET | Refills: 0 | Status: SHIPPED | OUTPATIENT
Start: 2024-04-08

## 2024-04-08 RX ORDER — FLUTICASONE PROPIONATE 50 MCG
2 SPRAY, SUSPENSION (ML) NASAL DAILY
Qty: 16 G | Refills: 0 | Status: SHIPPED | OUTPATIENT
Start: 2024-04-08

## 2024-04-08 RX ORDER — AZELASTINE 1 MG/ML
2 SPRAY, METERED NASAL 2 TIMES DAILY
Qty: 30 ML | Refills: 0 | Status: SHIPPED | OUTPATIENT
Start: 2024-04-08

## 2024-04-08 NOTE — PROGRESS NOTES
"Chief Complaint  Cough, URI, Sinus Problem, and Headache    History of Present Illness  Bob Rebollar presents today for new concern of possible sinus infection    Upper Respiratory Infection: Patient complains of symptoms of a sinus infection. Symptoms include congestion, cough, and sore throat. Onset of symptoms was 3 days ago, gradually worsening since that time. He also c/o  headache and fatigue  for the past 3 days . States is now moving into chest, no wheezing yet.   He is drinking plenty of fluids. Evaluation to date: none. Treatment to date: Nyquill cough suppressants, decongestants, and robi seltzer  gargeling with apple cider vinagar and nothing helping much.  Get \"hay fever\" in fall and more recently also in the spring.   Using albuterol inhaler once some help.     Work at [employer] Patient states he is here for a work excuse, states that the symptoms he will typically will be out a week.     He denies any specific ill contacts.  He did have influenza vaccination in September. He has not had annual COVID vaccination (last COVID vaccination was January 2022)    Patient Care Team:  Anisha Carmona MD as PCP - General   Current Outpatient Medications on File Prior to Visit   Medication Sig    Accu-Chek FastClix Lancets misc 1 each by Other route Daily.    acetaminophen (TYLENOL) 500 MG tablet Take 1 tablet by mouth Every 6 (Six) Hours As Needed for Mild Pain.    albuterol (PROVENTIL) (2.5 MG/3ML) 0.083% nebulizer solution Take 2.5 mg by nebulization Every 4 (Four) Hours As Needed for Wheezing.    albuterol sulfate  (90 Base) MCG/ACT inhaler Inhale 2 puffs Every 4 (Four) Hours As Needed for Wheezing.    Blood Glucose Monitoring Suppl (Accu-Chek Guide) w/Device kit 1 each Daily.    calcium carbonate (TUMS) 500 MG chewable tablet Chew 1 tablet As Needed for Indigestion or Heartburn.    colchicine 0.6 MG tablet Take 1 tablet by mouth 2 (Two) Times a Day As Needed (gout flare).    empagliflozin " "(Jardiance) 25 MG tablet tablet Take 1 tablet by mouth Daily.    glucose blood (Accu-Chek Guide) test strip 1 each by Other route Daily. Use as instructed    ibuprofen (ADVIL,MOTRIN) 200 MG tablet Take 1 tablet by mouth Every 6 (Six) Hours As Needed for Mild Pain.    ipratropium (ATROVENT) 0.02 % nebulizer solution Take 2.5 mL by nebulization Every 8 (Eight) Hours.    lisinopril (PRINIVIL,ZESTRIL) 10 MG tablet TAKE 1 TABLET BY MOUTH DAILY    metFORMIN (GLUCOPHAGE) 1000 MG tablet TAKE 1 TABLET BY MOUTH TWICE DAILY WITH MEALS    Nirmatrelvir & Ritonavir, 300mg/100mg, (PAXLOVID) 20 x 150 MG & 10 x 100MG tablet therapy pack tablet Take 3 tablets by mouth 2 (Two) Times a Day.    rosuvastatin (CRESTOR) 20 MG tablet Take 1 tablet by mouth Daily.    Semaglutide 3 MG tablet Take 1 tablet by mouth Daily.    vitamin D3 125 MCG (5000 UT) capsule capsule Take 1 capsule by mouth Daily.     No current facility-administered medications on file prior to visit.       Objective   Vital Signs:   /66 (BP Location: Right arm, Patient Position: Sitting, Cuff Size: Large Adult)   Pulse 106   Temp 97.8 °F (36.6 °C) (Temporal)   Resp 18   Ht 167.6 cm (66\")   Wt 103 kg (226 lb 6.4 oz)   SpO2 100%   BMI 36.54 kg/m²    BP Readings from Last 3 Encounters:   04/08/24 138/66   12/05/23 118/82   08/22/23 110/80     Wt Readings from Last 3 Encounters:   04/08/24 103 kg (226 lb 6.4 oz)   12/05/23 103 kg (226 lb 6.4 oz)   08/22/23 102 kg (224 lb)         Physical Exam  Vitals reviewed.   Constitutional:       Appearance: Normal appearance. He is well-developed. He is ill-appearing.   HENT:      Head: Normocephalic and atraumatic.      Comments: There is mild tenderness to palpation of frontal and maxillary sinuses bilaterally     Right Ear: Tympanic membrane, ear canal and external ear normal. There is no impacted cerumen.      Left Ear: Tympanic membrane, ear canal and external ear normal. There is no impacted cerumen.      Nose: Nose " normal. Congestion present. No rhinorrhea.      Mouth/Throat:      Mouth: Mucous membranes are moist.      Pharynx: No oropharyngeal exudate or posterior oropharyngeal erythema.   Eyes:      General:         Right eye: No discharge.         Left eye: No discharge.   Cardiovascular:      Rate and Rhythm: Normal rate and regular rhythm.      Heart sounds: Normal heart sounds. No murmur heard.     No friction rub. No gallop.   Pulmonary:      Effort: Pulmonary effort is normal. No respiratory distress.      Breath sounds: Normal breath sounds. No wheezing or rales.   Skin:     General: Skin is warm and dry.      Findings: No rash.   Neurological:      Mental Status: He is alert and oriented to person, place, and time.      Coordination: Coordination normal.      Gait: Gait normal.   Psychiatric:         Behavior: Behavior is cooperative.            Office Visit on 04/08/2024   Component Date Value Ref Range Status    SARS Antigen 04/08/2024 Not Detected  Not Detected, Presumptive Negative Final    Influenza A Antigen SYBIL 04/08/2024 Not Detected  Not Detected Final    Influenza B Antigen SYBIL 04/08/2024 Not Detected  Not Detected Final    Internal Control 04/08/2024 Passed  Passed Final    Lot Number 04/08/2024 [id number]   Final    Expiration Date 04/08/2024 01/08/2025   Final    Rapid Strep A Screen 04/08/2024 Negative  Negative, VALID, INVALID, Not Performed Final    Internal Control 04/08/2024 Passed  Passed Final    Lot Number 04/08/2024 [id number]   Final    Expiration Date 04/08/2024 07/09/2025   Final     A1C Last 3 Results          5/6/2023    08:26 8/19/2023    07:52 12/1/2023    08:13   HGBA1C Last 3 Results   Hemoglobin A1C 8.6  7.8  9.3      Lab Results   Component Value Date    CHLPL 164 12/01/2023    TRIG 207 (H) 12/01/2023    HDL 46 12/01/2023    LDL 83 12/01/2023     Lab Results   Component Value Date    TSH 4.350 01/20/2023     Lab Results   Component Value Date    GLUCOSE 130 (H) 12/01/2023    BUN 19  12/01/2023    CREATININE 0.97 12/01/2023    EGFRIFNONA 96 10/12/2021    EGFRIFAFRI 111 10/12/2021    BCR 20 12/01/2023    K 4.5 12/01/2023    CO2 19 (L) 12/01/2023    CALCIUM 9.4 12/01/2023    PROTENTOTREF 7.0 12/01/2023    ALBUMIN 4.4 12/01/2023    LABIL2 1.7 12/01/2023    AST 23 12/01/2023    ALT 27 12/01/2023     Lab Results   Component Value Date    WBC 7.5 01/20/2023    HGB 15.4 01/20/2023    HCT 45.8 01/20/2023    MCV 83 01/20/2023     01/20/2023                      Assessment and Plan    Diagnoses and all orders for this visit:    1. Acute cough (Primary)  -     POCT SARS-CoV-2 + Flu Antigen SYBIL  -     POC Rapid Strep A    2. Chest congestion  -     POCT SARS-CoV-2 + Flu Antigen SYBIL  -     POC Rapid Strep A    3. Acute nonintractable headache, unspecified headache type  -     POCT SARS-CoV-2 + Flu Antigen SYBIL  -     POC Rapid Strep A    4. Body mass index (BMI) of 36.0 to 36.9 in adult    5. History of tobacco use    6. Sore throat  -     POC Rapid Strep A    7. Bronchitis  -     azithromycin (ZITHROMAX) 250 MG tablet; Take 2 tablets the first day, then 1 tablet daily for 4 days.  Dispense: 6 tablet; Refill: 0    8. Seasonal allergic rhinitis, unspecified trigger  -     azelastine (ASTELIN) 0.1 % nasal spray; 2 sprays into the nostril(s) as directed by provider 2 (Two) Times a Day. Use in each nostril as directed  Dispense: 30 mL; Refill: 0  -     fluticasone (FLONASE) 50 MCG/ACT nasal spray; 2 sprays into the nostril(s) as directed by provider Daily.  Dispense: 16 g; Refill: 0    9. Sinus congestion  -     fluticasone (FLONASE) 50 MCG/ACT nasal spray; 2 sprays into the nostril(s) as directed by provider Daily.  Dispense: 16 g; Refill: 0    10. Type 2 diabetes mellitus without complication, without long-term current use of insulin        Bronchitis and sinus congestion with negative COVID, flu, and strep testing.  Symptoms likely as a result of seasonal allergies becoming bronchitis.  In addition to  Claritin recommend starting Astelin and Flonase.  Work note is given for today through Wednesday to return on Thursday.  If he is unable to return on Thursday advised him to contact Dr. Carmona for reevaluation or extension of work excuse.  He is not currently wheezing, he does have albuterol to use as needed.  Not starting steroids due to diabetes and recently increased glucose readings since he has not been taking his diabetes medicine.  Did advise to restart his statin these medications.  Did warn that there is an interaction between azithromycin and colchicine and advised not to take any colchicine until antibiotics are completed.      There are no discontinued medications.      Follow Up     Return if symptoms worsen or fail to improve.    Patient was given instructions and counseling regarding his condition or for health maintenance advice. Please see specific information pulled into the AVS if appropriate.

## 2024-04-11 ENCOUNTER — TELEPHONE (OUTPATIENT)
Dept: FAMILY MEDICINE CLINIC | Facility: CLINIC | Age: 54
End: 2024-04-11
Payer: COMMERCIAL

## 2024-04-11 DIAGNOSIS — J45.41 MODERATE PERSISTENT REACTIVE AIRWAY DISEASE WITH ACUTE EXACERBATION: ICD-10-CM

## 2024-04-11 DIAGNOSIS — J01.00 ACUTE NON-RECURRENT MAXILLARY SINUSITIS: ICD-10-CM

## 2024-04-11 RX ORDER — BENZONATATE 100 MG/1
100 CAPSULE ORAL 3 TIMES DAILY PRN
Qty: 30 CAPSULE | Refills: 0 | Status: SHIPPED | OUTPATIENT
Start: 2024-04-11

## 2024-04-11 RX ORDER — ALBUTEROL SULFATE 2.5 MG/3ML
2.5 SOLUTION RESPIRATORY (INHALATION) EVERY 4 HOURS PRN
Qty: 90 EACH | Refills: 0 | Status: SHIPPED | OUTPATIENT
Start: 2024-04-11

## 2024-04-11 RX ORDER — ALBUTEROL SULFATE 90 UG/1
2 AEROSOL, METERED RESPIRATORY (INHALATION) EVERY 4 HOURS PRN
Qty: 6.7 G | Refills: 6 | Status: SHIPPED | OUTPATIENT
Start: 2024-04-11

## 2024-04-11 NOTE — TELEPHONE ENCOUNTER
Caller: Bob Rebollar    Relationship: Self    Best call back number:[phone]    Which medication are you concerned about: ipratropium (ATROVENT) 0.02 % nebulizer solution     What are your concerns: PATIENT STATES THAT THERE IS AN ISSUE WITH THIS MEDICATION AND HIS INSURANCE. PLEASE ADVISE.

## 2024-04-11 NOTE — TELEPHONE ENCOUNTER
Caller: Rebollar Bob LIVIA    Relationship: Self    Best call back number:    [phone] (Home)       What medication are you requesting: SOMETHING FOR A COUGH    What are your current symptoms:      How long have you been experiencing symptoms:      Have you had these symptoms before:    [] Yes  [] No    Have you been treated for these symptoms before:   [] Yes  [] No    If a prescription is needed, what is your preferred pharmacy and phone number:  [facility] #[id number] - [place] AT [facility] OF  &  [address] - [phone] [facility] [phone] FX     Additional notes: PATIENT CALLED TO REQUEST A NEW PRESCRIPTION FOR SOMETHING FOR HIS COUGH, PATIENT STATES THAT HE CAN NOT SLEEP AT NIGHT FOR THE COUGHING.       THANKS

## 2024-04-11 NOTE — TELEPHONE ENCOUNTER
Caller: Bob Rebollar    Relationship: Self    Best call back number: [phone]    What medication are you requesting: SOMETHING FOR A COUGH     What are your current symptoms: COUGH     If a prescription is needed, what is your preferred pharmacy and phone number:    [facility] #[id number] - [place], [place] - [address] AT [facility] OF  &  - [phone]  - [phone]       Additional notes: PLEASE ADVISE. PATIENT CALLED ABOUT THIS MORNING, BUT NOTHING WAS SENT IN TO THE PHARMACY.

## 2024-04-11 NOTE — TELEPHONE ENCOUNTER
Caller: RebollarBob    Relationship: Self    Best call back number: [phone] (Home)     Requested Prescriptions:   albuterol (PROVENTIL) (2.5 MG/3ML) 0.083% nebulizer solution     ipratropium (ATROVENT) 0.02 % nebulizer solution     albuterol sulfate  (90 Base) MCG/ACT inhaler        Pharmacy where request should be sent:  [id number] - [facility] [address] AT [facility] OF  & [address] [facility] - [phone]  - [phone] FX     Last office visit with prescribing clinician: 12/5/2023   Last telemedicine visit with prescribing clinician: Visit date not found   Next office visit with prescribing clinician: Visit date not found     Additional details provided by patient: PATIENT CALLED TO REQUEST A MEDICATION REFILL ON MEDICATION. PATIENT STATES THAT HIS MEDICATION ARE OUT OF DATE.       Does the patient have less than a 3 day supply:  [x] Yes  [] No    Would you like a call back once the refill request has been completed: [] Yes [] No    If the office needs to give you a call back, can they leave a voicemail: [] Yes [] No    Varun Webb Rep   04/11/24 08:05 EDT         THANKS

## 2024-05-03 ENCOUNTER — TELEPHONE (OUTPATIENT)
Dept: FAMILY MEDICINE CLINIC | Facility: CLINIC | Age: 54
End: 2024-05-03

## 2024-05-03 ENCOUNTER — HOSPITAL ENCOUNTER (OUTPATIENT)
Dept: GENERAL RADIOLOGY | Facility: HOSPITAL | Age: 54
Discharge: HOME | End: 2024-05-03
Admitting: FAMILY MEDICINE
Payer: COMMERCIAL

## 2024-05-03 ENCOUNTER — OFFICE VISIT (OUTPATIENT)
Dept: FAMILY MEDICINE CLINIC | Facility: CLINIC | Age: 54
End: 2024-05-03
Payer: COMMERCIAL

## 2024-05-03 VITALS
RESPIRATION RATE: 18 BRPM | WEIGHT: 225.8 LBS | SYSTOLIC BLOOD PRESSURE: 122 MMHG | BODY MASS INDEX: 36.29 KG/M2 | OXYGEN SATURATION: 96 % | TEMPERATURE: 97.6 F | HEIGHT: 66 IN | DIASTOLIC BLOOD PRESSURE: 84 MMHG | HEART RATE: 96 BPM

## 2024-05-03 DIAGNOSIS — R05.1 ACUTE COUGH: ICD-10-CM

## 2024-05-03 DIAGNOSIS — I10 ESSENTIAL HYPERTENSION: ICD-10-CM

## 2024-05-03 DIAGNOSIS — R59.9 SWOLLEN LYMPH NODES: Primary | ICD-10-CM

## 2024-05-03 DIAGNOSIS — R59.0 LAD (LYMPHADENOPATHY), ANTERIOR CERVICAL: ICD-10-CM

## 2024-05-03 DIAGNOSIS — E66.01 CLASS 2 SEVERE OBESITY DUE TO EXCESS CALORIES WITH SERIOUS COMORBIDITY AND BODY MASS INDEX (BMI) OF 36.0 TO 36.9 IN ADULT: ICD-10-CM

## 2024-05-03 DIAGNOSIS — M25.561 CHRONIC PAIN OF RIGHT KNEE: ICD-10-CM

## 2024-05-03 DIAGNOSIS — J45.21 MILD INTERMITTENT REACTIVE AIRWAY DISEASE WITH ACUTE EXACERBATION: ICD-10-CM

## 2024-05-03 DIAGNOSIS — Z87.891 HISTORY OF TOBACCO USE: ICD-10-CM

## 2024-05-03 DIAGNOSIS — G89.29 CHRONIC PAIN OF RIGHT KNEE: ICD-10-CM

## 2024-05-03 PROBLEM — M79.671 RIGHT FOOT PAIN: Status: ACTIVE | Noted: 2024-05-03

## 2024-05-03 PROBLEM — J45.909 REACTIVE AIRWAY DISEASE: Status: ACTIVE | Noted: 2024-05-03

## 2024-05-03 PROCEDURE — 99214 OFFICE O/P EST MOD 30 MIN: CPT | Performed by: FAMILY MEDICINE

## 2024-05-03 PROCEDURE — 71046 X-RAY EXAM CHEST 2 VIEWS: CPT

## 2024-05-03 RX ORDER — LOSARTAN POTASSIUM 25 MG/1
25 TABLET ORAL DAILY
Qty: 30 TABLET | Refills: 12 | Status: SHIPPED | OUTPATIENT
Start: 2024-05-03

## 2024-05-03 RX ORDER — CEFDINIR 300 MG/1
300 CAPSULE ORAL 2 TIMES DAILY
Qty: 20 CAPSULE | Refills: 0 | Status: SHIPPED | OUTPATIENT
Start: 2024-05-03 | End: 2024-05-17

## 2024-05-03 RX ORDER — PREDNISONE 5 MG/1
5 TABLET ORAL DAILY
Qty: 45 TABLET | Refills: 0 | Status: SHIPPED | OUTPATIENT
Start: 2024-05-03 | End: 2024-05-17

## 2024-05-03 NOTE — TELEPHONE ENCOUNTER
Caller: Bob Rebollar    Relationship: Self    Best call back number: [phone]     What medication are you requesting: ANTIBIOTICS     What are your current symptoms: LYMPH NODES SWELLING IN NECK     How long have you been experiencing symptoms:  LAST NIGHT     Have you had these symptoms before:    [] Yes  [x] No    Have you been treated for these symptoms before:   [] Yes  [x] No    If a prescription is needed, what is your preferred pharmacy and phone number: [facility] [place][id number] - [place] [address] [address] AT [facility] OF  &  - [phone] [facility] [phone] FX     Additional notes:  PATIENT STATED THAT SWELLING STARTED LAST NIGHT. HE WOULD LIKE AN ANTIBIOTIC SENT IN.

## 2024-05-03 NOTE — PROGRESS NOTES
Subjective   Bob Rebollar is a 53 y.o. male. Presents to [facility]    Chief Complaint   Patient presents with    swollen lymphnodes    Knee Pain       History of Present Illness  Patient reports of swollen lymph nodes on the right side of his neck .Symptoms are pain, swelling and tenderness. 04/10 pain scale . No current treatment.   Knee Pain   The incident occurred more than 1 week ago (2 months). There was no injury mechanism. The pain is present in the right foot and right knee. The quality of the pain is described as aching and stabbing. The pain is at a severity of 4/10. The pain is mild. Pertinent negatives include no inability to bear weight, numbness or tingling. He reports no foreign bodies present. The symptoms are aggravated by palpation. He has tried ice and rest for the symptoms. The treatment provided no relief.   URI   This is a new problem. The current episode started 1 to 4 weeks ago. The problem has been waxing and waning. There has been no fever. Associated symptoms include congestion and coughing. Pertinent negatives include no chest pain. He has tried nothing for the symptoms.   Hypertension  This is a chronic problem. The current episode started more than 1 year ago. The problem is unchanged. The problem is controlled. Pertinent negatives include no chest pain or shortness of breath. Risk factors for coronary artery disease include male gender and obesity. Current antihypertension treatment includes angiotensin blockers. Compliance problems include exercise.       He scooted down on mattress on his belly. 2 months ago. It hurts pain is worse medially. Bending knee hurts worse. He feels shock like pain across the front of the knee.     He also has right foot pain.     I personally reviewed and updated the patient's allergies, medications, problem list, and past medical, surgical, social, and family history. I have reviewed and confirmed the accuracy of the History of Present  Illness and Review of Symptoms as documented by the MA/LPN/RN. Anisha Carmona MD    Allergies:  No Known Allergies    Social History:  Social History     Socioeconomic History    Marital status:    Tobacco Use    Smoking status: Former     Current packs/day: 0.00     Average packs/day: 1 pack/day for 8.0 years (8.0 ttl pk-yrs)     Types: Cigarettes     Start date:      Quit date:      Years since quittin.3    Smokeless tobacco: Never   Substance and Sexual Activity    Alcohol use: No    Drug use: Never    Sexual activity: Defer       Family History:  Family History   Problem Relation Age of Onset    Diabetes Mother         diabetes mellitus    Heart disease Mother         cardiovascular disease, ischemic    Diabetes Brother         diabetes mellitus       Past Medical History :  Patient Active Problem List   Diagnosis    Allergic rhinitis    History of tobacco use    Polyarthropathy or polyarthritis    Thoracic disc herniation    Essential hypertension    SI (sacroiliac) joint dysfunction    Gastroesophageal reflux disease without esophagitis    Polyphagia    Type 2 diabetes mellitus without complication, without long-term current use of insulin    Annual visit for general adult medical examination with abnormal findings    Screening PSA (prostate specific antigen)    Mixed hyperlipidemia    Myopia    Regular astigmatism    Presbyopia    Class 2 severe obesity due to excess calories with serious comorbidity and body mass index (BMI) of 36.0 to 36.9 in adult    Acute cough    Idiopathic chronic gout of multiple sites without tophus    Screening for colorectal cancer    LAD (lymphadenopathy), anterior cervical    Right foot pain    Reactive airway disease       Medication List:    Current Outpatient Medications:     Accu-Chek FastClix Lancets misc, 1 each by Other route Daily., Disp: 100 each, Rfl: 6    acetaminophen (TYLENOL) 500 MG tablet, Take 1 tablet by mouth Every 6 (Six) Hours As Needed  for Mild Pain., Disp: , Rfl:     albuterol (PROVENTIL) (2.5 MG/3ML) 0.083% nebulizer solution, Take 2.5 mg by nebulization Every 4 (Four) Hours As Needed for Wheezing., Disp: 90 each, Rfl: 0    albuterol sulfate  (90 Base) MCG/ACT inhaler, Inhale 2 puffs Every 4 (Four) Hours As Needed for Wheezing., Disp: 6.7 g, Rfl: 6    azelastine (ASTELIN) 0.1 % nasal spray, 2 sprays into the nostril(s) as directed by provider 2 (Two) Times a Day. Use in each nostril as directed, Disp: 30 mL, Rfl: 0    Blood Glucose Monitoring Suppl (Accu-Chek Guide) w/Device kit, 1 each Daily., Disp: 1 kit, Rfl: 0    calcium carbonate (TUMS) 500 MG chewable tablet, Chew 1 tablet As Needed for Indigestion or Heartburn., Disp: , Rfl:     colchicine 0.6 MG tablet, Take 1 tablet by mouth 2 (Two) Times a Day As Needed (gout flare)., Disp: 90 tablet, Rfl: 0    empagliflozin (Jardiance) 25 MG tablet tablet, Take 1 tablet by mouth Daily., Disp: 365 tablet, Rfl: 0    fluticasone (FLONASE) 50 MCG/ACT nasal spray, 2 sprays into the nostril(s) as directed by provider Daily., Disp: 16 g, Rfl: 0    glucose blood (Accu-Chek Guide) test strip, 1 each by Other route Daily. Use as instructed, Disp: 50 each, Rfl: 12    ibuprofen (ADVIL,MOTRIN) 200 MG tablet, Take 1 tablet by mouth Every 6 (Six) Hours As Needed for Mild Pain., Disp: , Rfl:     ipratropium (ATROVENT) 0.02 % nebulizer solution, Take 2.5 mL by nebulization Every 8 (Eight) Hours., Disp: 100 mL, Rfl: 12    metFORMIN (GLUCOPHAGE) 1000 MG tablet, TAKE 1 TABLET BY MOUTH TWICE DAILY WITH MEALS, Disp: 60 tablet, Rfl: 12    rosuvastatin (CRESTOR) 20 MG tablet, Take 1 tablet by mouth Daily., Disp: 90 tablet, Rfl: 3    Semaglutide 3 MG tablet, Take 1 tablet by mouth Daily., Disp: 30 tablet, Rfl: 0    vitamin D3 125 MCG (5000 UT) capsule capsule, Take 1 capsule by mouth Daily., Disp: , Rfl:     cefdinir (OMNICEF) 300 MG capsule, Take 1 capsule by mouth 2 (Two) Times a Day. May turn stool orange, Disp: 20  "capsule, Rfl: 0    losartan (COZAAR) 25 MG tablet, Take 1 tablet by mouth Daily., Disp: 30 tablet, Rfl: 12    predniSONE (DELTASONE) 5 MG tablet, Take 1 tablet by mouth Daily. 40mg x 3 days, 20mg x 3 days, 10mg x 3 days, 5mg x 3 days, Disp: 45 tablet, Rfl: 0    Past Surgical History:  No past surgical history on file.      Physical Exam:      Vital Signs:    Vitals:    05/03/24 1405   BP: 122/84   Pulse: 96   Resp: 18   Temp: 97.6 °F (36.4 °C)   SpO2: 96%        /84 (BP Location: Right arm, Patient Position: Sitting, Cuff Size: Adult)   Pulse 96   Temp 97.6 °F (36.4 °C) (Temporal)   Resp 18   Ht 167.6 cm (66\")   Wt 102 kg (225 lb 12.8 oz)   SpO2 96% Comment: room air  BMI 36.45 kg/m²     Wt Readings from Last 3 Encounters:   05/03/24 102 kg (225 lb 12.8 oz)   04/08/24 103 kg (226 lb 6.4 oz)   12/05/23 103 kg (226 lb 6.4 oz)       Result Review :                Physical Exam  Vitals reviewed.   Constitutional:       Appearance: Normal appearance. He is well-developed.   HENT:      Head: Normocephalic and atraumatic.      Right Ear: Tympanic membrane and external ear normal. No decreased hearing noted. No tenderness. No middle ear effusion. Tympanic membrane is not injected, erythematous, retracted or bulging.      Left Ear: Tympanic membrane and external ear normal. No decreased hearing noted. No tenderness.  No middle ear effusion. Tympanic membrane is not injected, erythematous, retracted or bulging.      Nose: Nose normal. No rhinorrhea.      Mouth/Throat:      Pharynx: No oropharyngeal exudate or posterior oropharyngeal erythema.   Eyes:      General:         Right eye: No discharge.         Left eye: No discharge.   Cardiovascular:      Rate and Rhythm: Normal rate and regular rhythm.      Heart sounds: Normal heart sounds. No murmur heard.     No friction rub. No gallop.   Pulmonary:      Effort: Pulmonary effort is normal. No respiratory distress.      Breath sounds: Normal breath sounds. No " wheezing or rales.   Lymphadenopathy:      Cervical: Cervical adenopathy present.      Right cervical: Superficial cervical adenopathy present.   Skin:     General: Skin is warm and dry.      Findings: No rash.   Neurological:      Mental Status: He is alert and oriented to person, place, and time.      Coordination: Coordination normal.      Gait: Gait normal.   Psychiatric:         Behavior: Behavior is cooperative.         Assessment and Plan:  Problems Addressed this Visit          Cardiac and Vasculature    Essential hypertension     Hypertension is stable and controlled  Continue current treatment regimen.  Blood pressure will be reassessed in 3 months.         Relevant Medications    losartan (COZAAR) 25 MG tablet       Endocrine and Metabolic    Class 2 severe obesity due to excess calories with serious comorbidity and body mass index (BMI) of 36.0 to 36.9 in adult       Pulmonary and Pneumonias    Reactive airway disease     increase fluids, tylenol for fever, motrin for pain. Humidifier to help with congestion and to sleep at night. Dicussed OTC meds, gargle with warm salt water. If there is recurrent fever, shortness of breath, lethargy, advised to come in to the office or go to the ER.  Discussed risks of steroids: hyperglycemia, osteoporosis, avascular necrosis, anxiety, insomnia and cataracts. Patient states understanding           Relevant Medications    predniSONE (DELTASONE) 5 MG tablet       Symptoms and Signs    LAD (lymphadenopathy), anterior cervical     Mild ant cervical  Discussed treatment, start antibiotics  Recheck in a few weeks         Relevant Medications    cefdinir (OMNICEF) 300 MG capsule    Other Relevant Orders    CBC & Differential (Completed)       Tobacco    History of tobacco use       Other    Acute cough    Relevant Orders    XR Chest 2 View (Completed)     Other Visit Diagnoses       Swollen lymph nodes    -  Primary    Chronic pain of right knee        Body mass index (BMI)  of 36.0 to 36.9 in adult              Diagnoses         Codes Comments    Swollen lymph nodes    -  Primary ICD-10-CM: R59.9  ICD-9-CM: 785.6     Chronic pain of right knee     ICD-10-CM: M25.561, G89.29  ICD-9-CM: 719.46, 338.29     History of tobacco use     ICD-10-CM: Z87.891  ICD-9-CM: V15.82     Body mass index (BMI) of 36.0 to 36.9 in adult     ICD-10-CM: Z68.36  ICD-9-CM: V85.36     LAD (lymphadenopathy), anterior cervical     ICD-10-CM: R59.0  ICD-9-CM: 785.6     Acute cough     ICD-10-CM: R05.1  ICD-9-CM: 786.2     Essential hypertension     ICD-10-CM: I10  ICD-9-CM: 401.9     Mild intermittent reactive airway disease with acute exacerbation     ICD-10-CM: J45.21  ICD-9-CM: 493.92     Class 2 severe obesity due to excess calories with serious comorbidity and body mass index (BMI) of 36.0 to 36.9 in adult     ICD-10-CM: E66.01, Z68.36  ICD-9-CM: 278.01, V85.36                          An After Visit Summary and PPPS were given to the patient.

## 2024-05-04 LAB
BASOPHILS # BLD AUTO: 0.1 X10E3/UL (ref 0–0.2)
BASOPHILS NFR BLD AUTO: 1 %
EOSINOPHIL # BLD AUTO: 0.2 X10E3/UL (ref 0–0.4)
EOSINOPHIL NFR BLD AUTO: 2 %
ERYTHROCYTE [DISTWIDTH] IN BLOOD BY AUTOMATED COUNT: 13.3 % (ref 11.6–15.4)
HCT VFR BLD AUTO: 46.1 % (ref 37.5–51)
HGB BLD-MCNC: 15.4 G/DL (ref 13–17.7)
IMM GRANULOCYTES # BLD AUTO: 0 X10E3/UL (ref 0–0.1)
IMM GRANULOCYTES NFR BLD AUTO: 0 %
LYMPHOCYTES # BLD AUTO: 1.8 X10E3/UL (ref 0.7–3.1)
LYMPHOCYTES NFR BLD AUTO: 29 %
MCH RBC QN AUTO: 27.6 PG (ref 26.6–33)
MCHC RBC AUTO-ENTMCNC: 33.4 G/DL (ref 31.5–35.7)
MCV RBC AUTO: 83 FL (ref 79–97)
MONOCYTES # BLD AUTO: 0.5 X10E3/UL (ref 0.1–0.9)
MONOCYTES NFR BLD AUTO: 8 %
NEUTROPHILS # BLD AUTO: 3.7 X10E3/UL (ref 1.4–7)
NEUTROPHILS NFR BLD AUTO: 60 %
PLATELET # BLD AUTO: 239 X10E3/UL (ref 150–450)
RBC # BLD AUTO: 5.58 X10E6/UL (ref 4.14–5.8)
WBC # BLD AUTO: 6.3 X10E3/UL (ref 3.4–10.8)

## 2024-05-13 NOTE — PROGRESS NOTES
Subjective   Bob Rebollar is a 53 y.o. male. Presents to [facility]    Chief Complaint   Patient presents with    Adenopathy    Knee Pain       Adenopathy  This is a new problem. The current episode started 1 to 4 weeks ago. The problem has been resolved. Pertinent negatives include no abdominal pain, chest pain, chills, congestion, coughing, fatigue, fever, headaches, nausea, numbness, vomiting or weakness. Nothing aggravates the symptoms. Treatments tried: antibitoics.   Knee Pain   The incident occurred more than 1 week ago. The injury mechanism was a twisting injury. The pain is present in the right knee. The quality of the pain is described as shooting. The pain is at a severity of 3/10. The pain has been Fluctuating since onset. Pertinent negatives include no inability to bear weight, loss of motion, loss of sensation, muscle weakness, numbness or tingling. He reports no foreign bodies present. Exacerbated by: bending knee. He has tried rest, acetaminophen and NSAIDs for the symptoms. The treatment provided mild relief.        I personally reviewed and updated the patient's allergies, medications, problem list, and past medical, surgical, social, and family history. I have reviewed and confirmed the accuracy of the History of Present Illness and Review of Symptoms as documented by the MA/LPN/RN. Anisha Carmona MD    Allergies:  No Known Allergies    Social History:  Social History     Socioeconomic History    Marital status:    Tobacco Use    Smoking status: Former     Current packs/day: 0.00     Average packs/day: 1 pack/day for 8.0 years (8.0 ttl pk-yrs)     Types: Cigarettes     Start date:      Quit date:      Years since quittin.4    Smokeless tobacco: Never   Vaping Use    Vaping status: Never Used   Substance and Sexual Activity    Alcohol use: No    Drug use: Never    Sexual activity: Defer       Family History:  Family History   Problem Relation Age of Onset     Diabetes Mother         diabetes mellitus    Heart disease Mother         cardiovascular disease, ischemic    Diabetes Brother         diabetes mellitus       Past Medical History :  Patient Active Problem List   Diagnosis    Allergic rhinitis    History of tobacco use    Polyarthropathy or polyarthritis    Thoracic disc herniation    Essential hypertension    SI (sacroiliac) joint dysfunction    Gastroesophageal reflux disease without esophagitis    Acute pain of right knee    Polyphagia    Type 2 diabetes mellitus without complication, without long-term current use of insulin    Annual visit for general adult medical examination with abnormal findings    Screening PSA (prostate specific antigen)    Mixed hyperlipidemia    Myopia    Regular astigmatism    Presbyopia    Class 2 severe obesity due to excess calories with serious comorbidity and body mass index (BMI) of 36.0 to 36.9 in adult    Acute cough    Idiopathic chronic gout of multiple sites without tophus    Screening for colorectal cancer    LAD (lymphadenopathy), anterior cervical    Right foot pain    Reactive airway disease       Medication List:    Current Outpatient Medications:     Accu-Chek FastClix Lancets misc, 1 each by Other route Daily., Disp: 100 each, Rfl: 6    acetaminophen (TYLENOL) 500 MG tablet, Take 1 tablet by mouth Every 6 (Six) Hours As Needed for Mild Pain., Disp: , Rfl:     albuterol (PROVENTIL) (2.5 MG/3ML) 0.083% nebulizer solution, Take 2.5 mg by nebulization Every 4 (Four) Hours As Needed for Wheezing., Disp: 90 each, Rfl: 0    albuterol sulfate  (90 Base) MCG/ACT inhaler, Inhale 2 puffs Every 4 (Four) Hours As Needed for Wheezing., Disp: 6.7 g, Rfl: 6    azelastine (ASTELIN) 0.1 % nasal spray, 2 sprays into the nostril(s) as directed by provider 2 (Two) Times a Day. Use in each nostril as directed, Disp: 30 mL, Rfl: 0    Blood Glucose Monitoring Suppl (Accu-Chek Guide) w/Device kit, 1 each Daily., Disp: 1 kit, Rfl: 0     "calcium carbonate (TUMS) 500 MG chewable tablet, Chew 1 tablet As Needed for Indigestion or Heartburn., Disp: , Rfl:     colchicine 0.6 MG tablet, Take 1 tablet by mouth 2 (Two) Times a Day As Needed (gout flare)., Disp: 90 tablet, Rfl: 0    empagliflozin (Jardiance) 25 MG tablet tablet, Take 1 tablet by mouth Daily., Disp: 365 tablet, Rfl: 0    fluticasone (FLONASE) 50 MCG/ACT nasal spray, 2 sprays into the nostril(s) as directed by provider Daily., Disp: 16 g, Rfl: 0    glucose blood (Accu-Chek Guide) test strip, 1 each by Other route Daily. Use as instructed, Disp: 50 each, Rfl: 12    ibuprofen (ADVIL,MOTRIN) 200 MG tablet, Take 1 tablet by mouth Every 6 (Six) Hours As Needed for Mild Pain., Disp: , Rfl:     ipratropium (ATROVENT) 0.02 % nebulizer solution, Take 2.5 mL by nebulization Every 8 (Eight) Hours., Disp: 100 mL, Rfl: 12    losartan (COZAAR) 25 MG tablet, Take 1 tablet by mouth Daily., Disp: 30 tablet, Rfl: 12    metFORMIN (GLUCOPHAGE) 1000 MG tablet, TAKE 1 TABLET BY MOUTH TWICE DAILY WITH MEALS, Disp: 60 tablet, Rfl: 12    rosuvastatin (CRESTOR) 20 MG tablet, Take 1 tablet by mouth Daily., Disp: 90 tablet, Rfl: 3    vitamin D3 125 MCG (5000 UT) capsule capsule, Take 1 capsule by mouth Daily. (Patient not taking: Reported on 5/17/2024), Disp: , Rfl:     Past Surgical History:  No past surgical history on file.      Physical Exam:      Vital Signs:    Vitals:    05/17/24 1529   BP: 152/88   Pulse: 102   Resp: 18   Temp: 97.1 °F (36.2 °C)   SpO2: 97%        /88   Pulse 102   Temp 97.1 °F (36.2 °C) (Temporal)   Resp 18   Ht 167.6 cm (66\")   Wt 104 kg (228 lb 3.2 oz)   SpO2 97%   BMI 36.83 kg/m²     Wt Readings from Last 3 Encounters:   05/17/24 104 kg (228 lb 3.2 oz)   05/03/24 102 kg (225 lb 12.8 oz)   04/08/24 103 kg (226 lb 6.4 oz)       Result Review :                Physical Exam  Vitals reviewed.   Constitutional:       Appearance: Normal appearance. He is well-developed.   HENT:      " Head: Normocephalic and atraumatic.   Eyes:      General:         Right eye: No discharge.         Left eye: No discharge.   Cardiovascular:      Rate and Rhythm: Normal rate and regular rhythm.      Heart sounds: Normal heart sounds. No murmur heard.     No friction rub. No gallop.   Pulmonary:      Effort: Pulmonary effort is normal. No respiratory distress.      Breath sounds: Normal breath sounds. No wheezing or rales.   Musculoskeletal:      Right knee: No swelling, deformity or crepitus. Normal range of motion. Normal meniscus and normal patellar mobility.   Lymphadenopathy:      Cervical: No cervical adenopathy.   Skin:     General: Skin is warm and dry.      Findings: No rash.   Neurological:      Mental Status: He is alert and oriented to person, place, and time.      Coordination: Coordination normal.      Gait: Gait normal.   Psychiatric:         Behavior: Behavior is cooperative.       Assessment and Plan:  Problems Addressed this Visit          Endocrine and Metabolic    Class 2 severe obesity due to excess calories with serious comorbidity and body mass index (BMI) of 36.0 to 36.9 in adult       Musculoskeletal and Injuries    Acute pain of right knee     Will get xray  Discussed nsaids and possibility of PT if not better           Relevant Orders    XR Knee 4+ View Right (Completed)    Ambulatory Referral to Physical Therapy for Evaluation & Treatment     Other Visit Diagnoses       Swollen lymph nodes    -  Primary    resolved          Diagnoses         Codes Comments    Swollen lymph nodes    -  Primary ICD-10-CM: R59.9  ICD-9-CM: 785.6 resolved    Class 2 severe obesity due to excess calories with serious comorbidity and body mass index (BMI) of 36.0 to 36.9 in adult     ICD-10-CM: E66.01, Z68.36  ICD-9-CM: 278.01, V85.36     Acute pain of right knee     ICD-10-CM: M25.561  ICD-9-CM: 719.46                          An After Visit Summary and PPPS were given to the patient.

## 2024-05-17 ENCOUNTER — HOSPITAL ENCOUNTER (OUTPATIENT)
Dept: GENERAL RADIOLOGY | Facility: HOSPITAL | Age: 54
Discharge: HOME | End: 2024-05-17
Admitting: FAMILY MEDICINE
Payer: COMMERCIAL

## 2024-05-17 ENCOUNTER — OFFICE VISIT (OUTPATIENT)
Dept: FAMILY MEDICINE CLINIC | Facility: CLINIC | Age: 54
End: 2024-05-17
Payer: COMMERCIAL

## 2024-05-17 VITALS
OXYGEN SATURATION: 97 % | HEIGHT: 66 IN | SYSTOLIC BLOOD PRESSURE: 152 MMHG | WEIGHT: 228.2 LBS | HEART RATE: 102 BPM | RESPIRATION RATE: 18 BRPM | DIASTOLIC BLOOD PRESSURE: 88 MMHG | BODY MASS INDEX: 36.67 KG/M2 | TEMPERATURE: 97.1 F

## 2024-05-17 DIAGNOSIS — M25.561 ACUTE PAIN OF RIGHT KNEE: ICD-10-CM

## 2024-05-17 DIAGNOSIS — R59.9 SWOLLEN LYMPH NODES: Primary | ICD-10-CM

## 2024-05-17 DIAGNOSIS — E66.01 CLASS 2 SEVERE OBESITY DUE TO EXCESS CALORIES WITH SERIOUS COMORBIDITY AND BODY MASS INDEX (BMI) OF 36.0 TO 36.9 IN ADULT: ICD-10-CM

## 2024-05-17 PROCEDURE — 99213 OFFICE O/P EST LOW 20 MIN: CPT | Performed by: FAMILY MEDICINE

## 2024-05-17 PROCEDURE — 73564 X-RAY EXAM KNEE 4 OR MORE: CPT

## 2024-05-17 NOTE — ASSESSMENT & PLAN NOTE
increase fluids, tylenol for fever, motrin for pain. Humidifier to help with congestion and to sleep at night. Dicussed OTC meds, gargle with warm salt water. If there is recurrent fever, shortness of breath, lethargy, advised to come in to the office or go to the ER.  Discussed risks of steroids: hyperglycemia, osteoporosis, avascular necrosis, anxiety, insomnia and cataracts. Patient states understanding

## 2024-06-26 ENCOUNTER — TELEPHONE (OUTPATIENT)
Dept: FAMILY MEDICINE CLINIC | Facility: CLINIC | Age: 54
End: 2024-06-26
Payer: COMMERCIAL

## 2024-06-26 NOTE — TELEPHONE ENCOUNTER
.    Caller: Bob Rebollar    Relationship: Self    Best call back number:     [phone] (Home)       What medication are you requesting: TIZANIDINE 4MG     What are your current symptoms: HURT BACK     How long have you been experiencing symptoms:  OVER THE WEEKEND     Have you had these symptoms before:    [x] Yes  [] No    Have you been treated for these symptoms before:   [x] Yes  [] No    If a prescription is needed, what is your preferred pharmacy and phone number: [id number] [address] [place] [address] [address] [facility] [address]  &  - [phone] [facility] [phone] FX     Additional notes:

## 2024-06-26 NOTE — TELEPHONE ENCOUNTER
I would recommend evaluation prior to starting a muscle relaxer.  Please have him schedule appointment with Dr. Carmona.

## 2024-06-26 NOTE — TELEPHONE ENCOUNTER
Called and spoke with the patient, he voiced understanding. He is scheduled for Friday at 8:15 am.

## 2024-06-27 NOTE — PROGRESS NOTES
Subjective   Bob Rebollar is a 53 y.o. male. Presents to [facility]    Chief Complaint   Patient presents with    Back Pain    Hypertension    Diabetes       Back Pain  This is a new problem. The current episode started in the past 7 days. The problem occurs constantly. The problem has been comes and goes since onset. The pain is present in the lumbar spine. The quality of the pain is described as burning and stabbing. The pain radiates to the left buttock. The pain is at a severity of 4/10. The pain is mild. The pain is The same all the time. The symptoms are aggravated by twisting and position. Pertinent negatives include no abdominal pain, dysuria, leg pain, numbness, pelvic pain or weakness. He has tried NSAIDs for the symptoms. The treatment provided no relief.   Hypertension  This is a chronic problem. The current episode started more than 1 year ago. The problem has been stable since onset. The problem is controlled. Current antihypertension treatment includes angiotensin blockers. The current treatment provides significant improvement. Compliance problems include diet.    Diabetes  He presents for his follow-up diabetic visit. He has type 2 diabetes mellitus. His disease course has been stable. Pertinent negatives for hypoglycemia include no confusion or dizziness. Pertinent negatives for diabetes include no weakness. Pertinent negatives for hypoglycemia complications include no blackouts. Symptoms are stable. Current diabetic treatment includes oral agent (dual therapy).        I personally reviewed and updated the patient's allergies, medications, problem list, and past medical, surgical, social, and family history. I have reviewed and confirmed the accuracy of the History of Present Illness and Review of Symptoms as documented by the MA/LPN/RN. Anisha Carmona MD    Allergies:  No Known Allergies    Social History:  Social History     Socioeconomic History    Marital status:     Tobacco Use    Smoking status: Former     Current packs/day: 0.00     Average packs/day: 1 pack/day for 8.0 years (8.0 ttl pk-yrs)     Types: Cigarettes     Start date:      Quit date:      Years since quittin.5    Smokeless tobacco: Never   Vaping Use    Vaping status: Never Used   Substance and Sexual Activity    Alcohol use: No    Drug use: Never    Sexual activity: Defer       Family History:  Family History   Problem Relation Age of Onset    Diabetes Mother         diabetes mellitus    Heart disease Mother         cardiovascular disease, ischemic    Diabetes Brother         diabetes mellitus       Past Medical History :  Patient Active Problem List   Diagnosis    Allergic rhinitis    History of tobacco use    Polyarthropathy or polyarthritis    Thoracic disc herniation    Essential hypertension    SI (sacroiliac) joint dysfunction    Gastroesophageal reflux disease without esophagitis    Acute pain of right knee    Polyphagia    Type 2 diabetes mellitus without complication, without long-term current use of insulin    Annual visit for general adult medical examination with abnormal findings    Screening PSA (prostate specific antigen)    Mixed hyperlipidemia    Myopia    Regular astigmatism    Presbyopia    Class 2 severe obesity due to excess calories with serious comorbidity and body mass index (BMI) of 36.0 to 36.9 in adult    Idiopathic chronic gout of multiple sites without tophus    Screening for colorectal cancer    LAD (lymphadenopathy), anterior cervical    Right foot pain    Reactive airway disease       Medication List:    Current Outpatient Medications:     Accu-Chek FastClix Lancets misc, 1 each by Other route Daily., Disp: 100 each, Rfl: 6    acetaminophen (TYLENOL) 500 MG tablet, Take 1 tablet by mouth Every 6 (Six) Hours As Needed for Mild Pain., Disp: , Rfl:     albuterol (PROVENTIL) (2.5 MG/3ML) 0.083% nebulizer solution, Take 2.5 mg by nebulization Every 4 (Four) Hours As  Needed for Wheezing., Disp: 90 each, Rfl: 0    albuterol sulfate  (90 Base) MCG/ACT inhaler, Inhale 2 puffs Every 4 (Four) Hours As Needed for Wheezing., Disp: 6.7 g, Rfl: 6    azelastine (ASTELIN) 0.1 % nasal spray, 2 sprays into the nostril(s) as directed by provider 2 (Two) Times a Day. Use in each nostril as directed, Disp: 30 mL, Rfl: 0    Blood Glucose Monitoring Suppl (Accu-Chek Guide) w/Device kit, 1 each Daily., Disp: 1 kit, Rfl: 0    calcium carbonate (TUMS) 500 MG chewable tablet, Chew 1 tablet As Needed for Indigestion or Heartburn., Disp: , Rfl:     colchicine 0.6 MG tablet, Take 1 tablet by mouth 2 (Two) Times a Day As Needed (gout flare)., Disp: 90 tablet, Rfl: 0    empagliflozin (Jardiance) 25 MG tablet tablet, Take 1 tablet by mouth Daily., Disp: 365 tablet, Rfl: 0    fluticasone (FLONASE) 50 MCG/ACT nasal spray, 2 sprays into the nostril(s) as directed by provider Daily., Disp: 16 g, Rfl: 0    glucose blood (Accu-Chek Guide) test strip, 1 each by Other route Daily. Use as instructed, Disp: 50 each, Rfl: 12    ibuprofen (ADVIL,MOTRIN) 200 MG tablet, Take 1 tablet by mouth Every 6 (Six) Hours As Needed for Mild Pain., Disp: , Rfl:     ipratropium (ATROVENT) 0.02 % nebulizer solution, Take 2.5 mL by nebulization Every 8 (Eight) Hours., Disp: 100 mL, Rfl: 12    losartan (COZAAR) 25 MG tablet, Take 1 tablet by mouth Daily., Disp: 30 tablet, Rfl: 12    rosuvastatin (CRESTOR) 20 MG tablet, Take 1 tablet by mouth Daily., Disp: 90 tablet, Rfl: 3    tiZANidine (ZANAFLEX) 4 MG tablet, Take 1 tablet by mouth At Night As Needed for Muscle Spasms., Disp: 30 tablet, Rfl: 3    vitamin D3 125 MCG (5000 UT) capsule capsule, Take 1 capsule by mouth Daily., Disp: , Rfl:     meloxicam (MOBIC) 7.5 MG tablet, Take 1 tablet by mouth Daily., Disp: 30 tablet, Rfl: 12    metFORMIN ER (GLUCOPHAGE-XR) 500 MG 24 hr tablet, Take 2 tablets by mouth Daily With Breakfast., Disp: 60 tablet, Rfl: 12    Semaglutide,0.25 or  "0.5MG/DOS, (Ozempic, 0.25 or 0.5 MG/DOSE,) 2 MG/1.5ML solution pen-injector, Inject 0.25 mg under the skin into the appropriate area as directed 1 (One) Time Per Week., Disp: 1.5 mL, Rfl: 3    Past Surgical History:  No past surgical history on file.      Physical Exam:      Vital Signs:    Vitals:    06/28/24 0826   BP: 122/84   Pulse: 80   Resp: 18   Temp: 97.3 °F (36.3 °C)   SpO2: 95%        /84 (BP Location: Right arm, Patient Position: Sitting, Cuff Size: Adult)   Pulse 80   Temp 97.3 °F (36.3 °C) (Temporal)   Resp 18   Ht 167.6 cm (66\")   Wt 104 kg (229 lb 3.2 oz)   SpO2 95% Comment: room air  BMI 36.99 kg/m²     Wt Readings from Last 3 Encounters:   06/28/24 104 kg (229 lb 3.2 oz)   05/17/24 104 kg (228 lb 3.2 oz)   05/03/24 102 kg (225 lb 12.8 oz)       Result Review :   The following data was reviewed by: Anisha Carmona MD on 06/28/2024:  A1C Last 3 Results          8/19/2023    07:52 12/1/2023    08:13 6/28/2024    08:43   HGBA1C Last 3 Results   Hemoglobin A1C 7.8  9.3  9.4               Physical Exam  Vitals reviewed.   Constitutional:       Appearance: Normal appearance. He is well-developed.   HENT:      Head: Normocephalic and atraumatic.   Eyes:      General:         Right eye: No discharge.         Left eye: No discharge.   Cardiovascular:      Rate and Rhythm: Normal rate and regular rhythm.      Heart sounds: Normal heart sounds. No murmur heard.     No friction rub. No gallop.   Pulmonary:      Effort: Pulmonary effort is normal. No respiratory distress.      Breath sounds: Normal breath sounds. No wheezing or rales.   Skin:     General: Skin is warm and dry.      Findings: No rash.   Neurological:      Mental Status: He is alert and oriented to person, place, and time.      Coordination: Coordination normal.      Gait: Gait normal.   Psychiatric:         Behavior: Behavior is cooperative.         Assessment and Plan:  Problems Addressed this Visit          Cardiac and Vasculature "    Essential hypertension     Hypertension is stable and controlled  Continue current treatment regimen.  Dietary sodium restriction.  Weight loss.  Blood pressure will be reassessed in 3 months.         Mixed hyperlipidemia      He is on crestor  Will check labs  Continue current treatment         Relevant Orders    Comprehensive Metabolic Panel    Lipid Panel With / Chol / HDL Ratio       Endocrine and Metabolic    Type 2 diabetes mellitus without complication, without long-term current use of insulin     Diabetes is worsening.   Medication changes per orders.  Recommended a Mediterranean style of eating  Diabetes will be reassessed in 3 months    He has not been taking metformin  Discussed uncontrolled diabetes and its risks cerebrovascularly, renal and possible loss of vision/limbs         Relevant Medications    metFORMIN ER (GLUCOPHAGE-XR) 500 MG 24 hr tablet    Semaglutide,0.25 or 0.5MG/DOS, (Ozempic, 0.25 or 0.5 MG/DOSE,) 2 MG/1.5ML solution pen-injector    Other Relevant Orders    POC Glycosylated Hemoglobin (Hb A1C) (Completed)    Class 2 severe obesity due to excess calories with serious comorbidity and body mass index (BMI) of 36.0 to 36.9 in adult       Tobacco    History of tobacco use     Other Visit Diagnoses       Acute left-sided low back pain with left-sided sciatica    -  Primary    Relevant Medications    tiZANidine (ZANAFLEX) 4 MG tablet    meloxicam (MOBIC) 7.5 MG tablet          Diagnoses         Codes Comments    Acute left-sided low back pain with left-sided sciatica    -  Primary ICD-10-CM: M54.42  ICD-9-CM: 724.2, 724.3     Class 2 severe obesity due to excess calories with serious comorbidity and body mass index (BMI) of 36.0 to 36.9 in adult     ICD-10-CM: E66.01, Z68.36  ICD-9-CM: 278.01, V85.36     History of tobacco use     ICD-10-CM: Z87.891  ICD-9-CM: V15.82     Type 2 diabetes mellitus without complication, without long-term current use of insulin     ICD-10-CM: E11.9  ICD-9-CM:  250.00     Essential hypertension     ICD-10-CM: I10  ICD-9-CM: 401.9     Mixed hyperlipidemia     ICD-10-CM: E78.2  ICD-9-CM: 272.2                          An After Visit Summary and PPPS were given to the patient.

## 2024-06-28 ENCOUNTER — OFFICE VISIT (OUTPATIENT)
Dept: FAMILY MEDICINE CLINIC | Facility: CLINIC | Age: 54
End: 2024-06-28
Payer: COMMERCIAL

## 2024-06-28 VITALS
HEART RATE: 80 BPM | OXYGEN SATURATION: 95 % | RESPIRATION RATE: 18 BRPM | WEIGHT: 229.2 LBS | TEMPERATURE: 97.3 F | HEIGHT: 66 IN | BODY MASS INDEX: 36.83 KG/M2 | SYSTOLIC BLOOD PRESSURE: 122 MMHG | DIASTOLIC BLOOD PRESSURE: 84 MMHG

## 2024-06-28 DIAGNOSIS — I10 ESSENTIAL HYPERTENSION: ICD-10-CM

## 2024-06-28 DIAGNOSIS — E78.2 MIXED HYPERLIPIDEMIA: ICD-10-CM

## 2024-06-28 DIAGNOSIS — Z87.891 HISTORY OF TOBACCO USE: ICD-10-CM

## 2024-06-28 DIAGNOSIS — E11.9 TYPE 2 DIABETES MELLITUS WITHOUT COMPLICATION, WITHOUT LONG-TERM CURRENT USE OF INSULIN: ICD-10-CM

## 2024-06-28 DIAGNOSIS — E66.01 CLASS 2 SEVERE OBESITY DUE TO EXCESS CALORIES WITH SERIOUS COMORBIDITY AND BODY MASS INDEX (BMI) OF 36.0 TO 36.9 IN ADULT: ICD-10-CM

## 2024-06-28 DIAGNOSIS — M54.42 ACUTE LEFT-SIDED LOW BACK PAIN WITH LEFT-SIDED SCIATICA: Primary | ICD-10-CM

## 2024-06-28 PROBLEM — R05.1 ACUTE COUGH: Status: RESOLVED | Noted: 2023-03-23 | Resolved: 2024-06-28

## 2024-06-28 LAB
EXPIRATION DATE: ABNORMAL
HBA1C MFR BLD: 9.4 % (ref 4.5–5.7)
Lab: ABNORMAL

## 2024-06-28 PROCEDURE — 99214 OFFICE O/P EST MOD 30 MIN: CPT | Performed by: FAMILY MEDICINE

## 2024-06-28 PROCEDURE — 83036 HEMOGLOBIN GLYCOSYLATED A1C: CPT | Performed by: FAMILY MEDICINE

## 2024-06-28 RX ORDER — MELOXICAM 7.5 MG/1
7.5 TABLET ORAL DAILY
Qty: 30 TABLET | Refills: 12 | Status: SHIPPED | OUTPATIENT
Start: 2024-06-28

## 2024-06-28 RX ORDER — TIZANIDINE 4 MG/1
4 TABLET ORAL NIGHTLY PRN
COMMUNITY
End: 2024-06-28 | Stop reason: SDUPTHER

## 2024-06-28 RX ORDER — TIZANIDINE 4 MG/1
4 TABLET ORAL NIGHTLY PRN
Qty: 30 TABLET | Refills: 3 | Status: SHIPPED | OUTPATIENT
Start: 2024-06-28

## 2024-06-28 RX ORDER — METFORMIN HYDROCHLORIDE 500 MG/1
1000 TABLET, EXTENDED RELEASE ORAL
Qty: 60 TABLET | Refills: 12 | Status: SHIPPED | OUTPATIENT
Start: 2024-06-28

## 2024-06-28 RX ORDER — SEMAGLUTIDE 1.34 MG/ML
0.25 INJECTION, SOLUTION SUBCUTANEOUS WEEKLY
Qty: 1.5 ML | Refills: 3 | Status: SHIPPED | OUTPATIENT
Start: 2024-06-28

## 2024-06-28 NOTE — ASSESSMENT & PLAN NOTE
Diabetes is worsening.   Medication changes per orders.  Recommended a Mediterranean style of eating  Diabetes will be reassessed in 3 months    He has not been taking metformin  Discussed uncontrolled diabetes and its risks cerebrovascularly, renal and possible loss of vision/limbs

## 2024-07-13 LAB
ALBUMIN SERPL-MCNC: 4.4 G/DL (ref 3.8–4.9)
ALP SERPL-CCNC: 82 IU/L (ref 44–121)
ALT SERPL-CCNC: 27 IU/L (ref 0–44)
AST SERPL-CCNC: 23 IU/L (ref 0–40)
BILIRUB SERPL-MCNC: 0.8 MG/DL (ref 0–1.2)
BUN SERPL-MCNC: 16 MG/DL (ref 6–24)
BUN/CREAT SERPL: 19 (ref 9–20)
CALCIUM SERPL-MCNC: 9.5 MG/DL (ref 8.7–10.2)
CHLORIDE SERPL-SCNC: 103 MMOL/L (ref 96–106)
CHOLEST SERPL-MCNC: 167 MG/DL (ref 100–199)
CHOLEST/HDLC SERPL: 3.3 RATIO (ref 0–5)
CO2 SERPL-SCNC: 20 MMOL/L (ref 20–29)
CREAT SERPL-MCNC: 0.83 MG/DL (ref 0.76–1.27)
EGFRCR SERPLBLD CKD-EPI 2021: 105 ML/MIN/1.73
GLOBULIN SER CALC-MCNC: 2.5 G/DL (ref 1.5–4.5)
GLUCOSE SERPL-MCNC: 177 MG/DL (ref 70–99)
HDLC SERPL-MCNC: 50 MG/DL
LDLC SERPL CALC-MCNC: 88 MG/DL (ref 0–99)
POTASSIUM SERPL-SCNC: 4.2 MMOL/L (ref 3.5–5.2)
PROT SERPL-MCNC: 6.9 G/DL (ref 6–8.5)
SODIUM SERPL-SCNC: 138 MMOL/L (ref 134–144)
TRIGL SERPL-MCNC: 168 MG/DL (ref 0–149)
VLDLC SERPL CALC-MCNC: 29 MG/DL (ref 5–40)

## 2024-07-23 NOTE — PROGRESS NOTES
Subjective   Bob Rebollar is a 53 y.o. male. Presents to [facility]    Chief Complaint   Patient presents with    Back Pain    Diabetes       Back Pain  This is a new problem. The current episode started 1 to 4 weeks ago. The problem occurs constantly. The problem has been resolved since onset. The pain radiates to the left buttock. The pain is at a severity of 0/10. The patient is experiencing no pain. The symptoms are aggravated by twisting and position. Pertinent negatives include no abdominal pain, dysuria, leg pain, numbness, pelvic pain or weakness. He has tried NSAIDs (zanaflex) for the symptoms. The treatment provided significant relief.   Diabetes  He presents for his follow-up diabetic visit. He has type 2 diabetes mellitus. His disease course has been stable. Pertinent negatives for hypoglycemia include no confusion or dizziness. Pertinent negatives for diabetes include no weakness. Pertinent negatives for hypoglycemia complications include no blackouts. Symptoms are stable. Risk factors for coronary artery disease include diabetes mellitus, male sex, obesity, hypertension and dyslipidemia. Current diabetic treatment includes oral agent (dual therapy). He is following a generally healthy diet. Meal planning includes avoidance of concentrated sweets. His overall blood glucose range is 110-130 mg/dl. An ACE inhibitor/angiotensin II receptor blocker is not being taken. He does not see a podiatrist. Eye exam is current.    Semaglutide metformin and jardiance is working.     I personally reviewed and updated the patient's allergies, medications, problem list, and past medical, surgical, social, and family history. I have reviewed and confirmed the accuracy of the History of Present Illness and Review of Symptoms as documented by the MA/LPN/RN. Anisha Carmona MD    Allergies:  No Known Allergies    Social History:  Social History     Socioeconomic History    Marital status:    Tobacco  Use    Smoking status: Former     Current packs/day: 0.00     Average packs/day: 1 pack/day for 8.0 years (8.0 ttl pk-yrs)     Types: Cigarettes     Start date:      Quit date:      Years since quittin.5    Smokeless tobacco: Never   Vaping Use    Vaping status: Never Used   Substance and Sexual Activity    Alcohol use: No    Drug use: Never    Sexual activity: Defer       Family History:  Family History   Problem Relation Age of Onset    Diabetes Mother         diabetes mellitus    Heart disease Mother         cardiovascular disease, ischemic    Diabetes Brother         diabetes mellitus       Past Medical History :  Patient Active Problem List   Diagnosis    Allergic rhinitis    History of tobacco use    Polyarthropathy or polyarthritis    Thoracic disc herniation    Essential hypertension    SI (sacroiliac) joint dysfunction    Gastroesophageal reflux disease without esophagitis    Acute pain of right knee    Polyphagia    Type 2 diabetes mellitus without complication, without long-term current use of insulin    Annual visit for general adult medical examination with abnormal findings    Screening PSA (prostate specific antigen)    Mixed hyperlipidemia    Myopia    Regular astigmatism    Presbyopia    Class 2 severe obesity due to excess calories with serious comorbidity and body mass index (BMI) of 36.0 to 36.9 in adult    Idiopathic chronic gout of multiple sites without tophus    Screening for colorectal cancer    LAD (lymphadenopathy), anterior cervical    Right foot pain    Reactive airway disease       Medication List:    Current Outpatient Medications:     Accu-Chek FastClix Lancets misc, 1 each by Other route Daily., Disp: 100 each, Rfl: 6    acetaminophen (TYLENOL) 500 MG tablet, Take 1 tablet by mouth Every 6 (Six) Hours As Needed for Mild Pain., Disp: , Rfl:     albuterol (PROVENTIL) (2.5 MG/3ML) 0.083% nebulizer solution, Take 2.5 mg by nebulization Every 4 (Four) Hours As Needed for  Wheezing., Disp: 90 each, Rfl: 0    albuterol sulfate  (90 Base) MCG/ACT inhaler, Inhale 2 puffs Every 4 (Four) Hours As Needed for Wheezing., Disp: 6.7 g, Rfl: 6    azelastine (ASTELIN) 0.1 % nasal spray, 2 sprays into the nostril(s) as directed by provider 2 (Two) Times a Day. Use in each nostril as directed, Disp: 30 mL, Rfl: 0    Blood Glucose Monitoring Suppl (Accu-Chek Guide) w/Device kit, 1 each Daily., Disp: 1 kit, Rfl: 0    calcium carbonate (TUMS) 500 MG chewable tablet, Chew 1 tablet As Needed for Indigestion or Heartburn., Disp: , Rfl:     colchicine 0.6 MG tablet, Take 1 tablet by mouth 2 (Two) Times a Day As Needed (gout flare)., Disp: 90 tablet, Rfl: 0    empagliflozin (Jardiance) 25 MG tablet tablet, Take 1 tablet by mouth Daily., Disp: 365 tablet, Rfl: 0    fluticasone (FLONASE) 50 MCG/ACT nasal spray, 2 sprays into the nostril(s) as directed by provider Daily., Disp: 16 g, Rfl: 0    glucose blood (Accu-Chek Guide) test strip, 1 each by Other route Daily. Use as instructed, Disp: 50 each, Rfl: 12    ibuprofen (ADVIL,MOTRIN) 200 MG tablet, Take 1 tablet by mouth Every 6 (Six) Hours As Needed for Mild Pain., Disp: , Rfl:     ipratropium (ATROVENT) 0.02 % nebulizer solution, Take 2.5 mL by nebulization Every 8 (Eight) Hours., Disp: 100 mL, Rfl: 12    losartan (COZAAR) 25 MG tablet, Take 1 tablet by mouth Daily., Disp: 30 tablet, Rfl: 12    meloxicam (MOBIC) 7.5 MG tablet, Take 1 tablet by mouth Daily., Disp: 30 tablet, Rfl: 12    metFORMIN ER (GLUCOPHAGE-XR) 500 MG 24 hr tablet, Take 2 tablets by mouth Daily With Breakfast., Disp: 60 tablet, Rfl: 12    rosuvastatin (CRESTOR) 20 MG tablet, Take 1 tablet by mouth Daily., Disp: 90 tablet, Rfl: 3    Semaglutide,0.25 or 0.5MG/DOS, (Ozempic, 0.25 or 0.5 MG/DOSE,) 2 MG/1.5ML solution pen-injector, Inject 0.5 mg under the skin into the appropriate area as directed 1 (One) Time Per Week., Disp: 1.5 mL, Rfl: 5    tiZANidine (ZANAFLEX) 4 MG tablet, Take 1  "tablet by mouth At Night As Needed for Muscle Spasms., Disp: 30 tablet, Rfl: 3    vitamin D3 125 MCG (5000 UT) capsule capsule, Take 1 capsule by mouth Daily., Disp: , Rfl:     Past Surgical History:  No past surgical history on file.      Physical Exam:      Vital Signs:    Vitals:    07/26/24 1522   BP: 122/82   Pulse: 85   Resp: 17   Temp: 97.5 °F (36.4 °C)   SpO2: 97%        /82 (BP Location: Right arm, Patient Position: Sitting, Cuff Size: Adult)   Pulse 85   Temp 97.5 °F (36.4 °C) (Temporal)   Resp 17   Ht 167.6 cm (66\")   Wt 102 kg (224 lb 9.6 oz)   SpO2 97% Comment: ra  BMI 36.25 kg/m²     Wt Readings from Last 3 Encounters:   07/26/24 102 kg (224 lb 9.6 oz)   06/28/24 104 kg (229 lb 3.2 oz)   05/17/24 104 kg (228 lb 3.2 oz)       Result Review :                Physical Exam  Vitals reviewed.   Constitutional:       Appearance: Normal appearance. He is well-developed.   HENT:      Head: Normocephalic and atraumatic.   Eyes:      General:         Right eye: No discharge.         Left eye: No discharge.   Cardiovascular:      Rate and Rhythm: Normal rate and regular rhythm.      Heart sounds: Normal heart sounds. No murmur heard.     No friction rub. No gallop.   Pulmonary:      Effort: Pulmonary effort is normal. No respiratory distress.      Breath sounds: Normal breath sounds. No wheezing or rales.   Musculoskeletal:      Right knee: Crepitus present. Normal range of motion. No tenderness.   Feet:      Right foot:      Protective Sensation: 10 sites tested.  10 sites sensed.      Skin integrity: Skin integrity normal.      Toenail Condition: Right toenails are normal.      Left foot:      Protective Sensation: 10 sites tested.  10 sites sensed.      Skin integrity: Skin integrity normal.      Toenail Condition: Left toenails are normal.   Skin:     General: Skin is warm and dry.      Findings: No rash.   Neurological:      Mental Status: He is alert and oriented to person, place, and time.      " Coordination: Coordination normal.      Gait: Gait normal.   Psychiatric:         Behavior: Behavior is cooperative.     Diabetic Foot Exam Performed and Monofilament Test Performed     Assessment and Plan:  Problems Addressed this Visit          Endocrine and Metabolic    Type 2 diabetes mellitus without complication, without long-term current use of insulin     Diabetes is improving with treatment. But not at goal  Medication changes per orders.  Recommended a Mediterranean style of eating  Regular aerobic exercise.  Diabetes will be reassessed in 3 months         Relevant Medications    Semaglutide,0.25 or 0.5MG/DOS, (Ozempic, 0.25 or 0.5 MG/DOSE,) 2 MG/1.5ML solution pen-injector    Class 2 severe obesity due to excess calories with serious comorbidity and body mass index (BMI) of 36.0 to 36.9 in adult     Patient's (Body mass index is 36.25 kg/m².) indicates that they are morbidly/severely obese (BMI > 40 or > 35 with obesity - related health condition) with health conditions that include hypertension and diabetes mellitus . Weight is  improved but not at goal . BMI  is above average; BMI management plan is completed. We discussed low calorie, low carb based diet program, portion control, increasing exercise, and increase wegovy .             Musculoskeletal and Injuries    Acute pain of right knee     He is going to see his orthopedist            Tobacco    History of tobacco use     Other Visit Diagnoses       Acute left-sided low back pain with left-sided sciatica    -  Primary    resolved          Diagnoses         Codes Comments    Acute left-sided low back pain with left-sided sciatica    -  Primary ICD-10-CM: M54.42  ICD-9-CM: 724.2, 724.3 resolved    Class 2 severe obesity due to excess calories with serious comorbidity and body mass index (BMI) of 36.0 to 36.9 in adult     ICD-10-CM: E66.01, Z68.36  ICD-9-CM: 278.01, V85.36     History of tobacco use     ICD-10-CM: Z87.891  ICD-9-CM: V15.82     Type 2  diabetes mellitus without complication, without long-term current use of insulin     ICD-10-CM: E11.9  ICD-9-CM: 250.00     Acute pain of right knee     ICD-10-CM: M25.561  ICD-9-CM: 719.46 resolved                         An After Visit Summary and PPPS were given to the patient.

## 2024-07-26 ENCOUNTER — OFFICE VISIT (OUTPATIENT)
Dept: FAMILY MEDICINE CLINIC | Facility: CLINIC | Age: 54
End: 2024-07-26
Payer: COMMERCIAL

## 2024-07-26 VITALS
WEIGHT: 224.6 LBS | SYSTOLIC BLOOD PRESSURE: 122 MMHG | TEMPERATURE: 97.5 F | DIASTOLIC BLOOD PRESSURE: 82 MMHG | HEART RATE: 85 BPM | BODY MASS INDEX: 36.1 KG/M2 | OXYGEN SATURATION: 97 % | RESPIRATION RATE: 17 BRPM | HEIGHT: 66 IN

## 2024-07-26 DIAGNOSIS — Z87.891 HISTORY OF TOBACCO USE: ICD-10-CM

## 2024-07-26 DIAGNOSIS — E11.9 TYPE 2 DIABETES MELLITUS WITHOUT COMPLICATION, WITHOUT LONG-TERM CURRENT USE OF INSULIN: ICD-10-CM

## 2024-07-26 DIAGNOSIS — M25.561 ACUTE PAIN OF RIGHT KNEE: ICD-10-CM

## 2024-07-26 DIAGNOSIS — M54.42 ACUTE LEFT-SIDED LOW BACK PAIN WITH LEFT-SIDED SCIATICA: Primary | ICD-10-CM

## 2024-07-26 DIAGNOSIS — E66.01 CLASS 2 SEVERE OBESITY DUE TO EXCESS CALORIES WITH SERIOUS COMORBIDITY AND BODY MASS INDEX (BMI) OF 36.0 TO 36.9 IN ADULT: ICD-10-CM

## 2024-07-26 PROCEDURE — 99214 OFFICE O/P EST MOD 30 MIN: CPT | Performed by: FAMILY MEDICINE

## 2024-07-26 RX ORDER — SEMAGLUTIDE 1.34 MG/ML
0.5 INJECTION, SOLUTION SUBCUTANEOUS WEEKLY
Qty: 1.5 ML | Refills: 5 | Status: SHIPPED | OUTPATIENT
Start: 2024-07-26

## 2024-07-31 NOTE — ASSESSMENT & PLAN NOTE
Patient's (Body mass index is 36.25 kg/m².) indicates that they are morbidly/severely obese (BMI > 40 or > 35 with obesity - related health condition) with health conditions that include hypertension and diabetes mellitus . Weight is  improved but not at goal . BMI  is above average; BMI management plan is completed. We discussed low calorie, low carb based diet program, portion control, increasing exercise, and increase wegovy .

## 2024-07-31 NOTE — ASSESSMENT & PLAN NOTE
Diabetes is improving with treatment. But not at goal  Medication changes per orders.  Recommended a Mediterranean style of eating  Regular aerobic exercise.  Diabetes will be reassessed in 3 months

## 2024-09-25 DIAGNOSIS — M1A.09X0 IDIOPATHIC CHRONIC GOUT OF MULTIPLE SITES WITHOUT TOPHUS: ICD-10-CM

## 2024-09-27 RX ORDER — COLCHICINE 0.6 MG/1
TABLET ORAL
Qty: 90 TABLET | Refills: 0 | Status: SHIPPED | OUTPATIENT
Start: 2024-09-27

## 2024-10-06 DIAGNOSIS — M1A.09X0 IDIOPATHIC CHRONIC GOUT OF MULTIPLE SITES WITHOUT TOPHUS: ICD-10-CM

## 2024-10-07 RX ORDER — COLCHICINE 0.6 MG/1
TABLET ORAL
Qty: 90 TABLET | Refills: 0 | Status: SHIPPED | OUTPATIENT
Start: 2024-10-07

## 2024-10-23 DIAGNOSIS — E78.2 MIXED HYPERLIPIDEMIA: ICD-10-CM

## 2024-10-24 RX ORDER — ROSUVASTATIN CALCIUM 20 MG/1
20 TABLET, COATED ORAL DAILY
Qty: 90 TABLET | Refills: 3 | Status: SHIPPED | OUTPATIENT
Start: 2024-10-24

## 2024-12-16 NOTE — PROGRESS NOTES
Chief Complaint  Back Pain, Diabetes, Hypertension, and Hyperlipidemia    Subjective     CC  Problem List  Visit Diagnosis   Encounters  Notes  Medications  Labs  Result Review Imaging  Media    Bob Rebollar presents to [facility] for   Back Pain  This is a new problem. The current episode started in the past 7 days. The problem occurs constantly. The problem is unchanged. The quality of the pain is described as burning, shooting and stabbing. The pain radiates to the left buttock. The pain is at a severity of 8/10. The pain is moderate. The pain is The same all the time. The symptoms are aggravated by bending, lying down, sitting, twisting, standing and position. Stiffness is present All day. Pertinent negatives include no abdominal pain, bladder incontinence, bowel incontinence, chest pain, dysuria, headaches, paresthesias, pelvic pain, perianal numbness or weakness. He has tried NSAIDs and muscle relaxant for the symptoms. The treatment provided mild relief.   Diabetes  He presents for his follow-up diabetic visit. He has type 2 diabetes mellitus. The initial diagnosis of diabetes was made 3 years ago. His disease course has been fluctuating. Pertinent negatives for hypoglycemia include no dizziness, headaches or sweats. Pertinent negatives for diabetes include no blurred vision, no chest pain, no fatigue, no foot ulcerations, no polydipsia, no polyuria and no weakness. Pertinent negatives for hypoglycemia complications include no blackouts and no hospitalization. Symptoms are worsening. Pertinent negatives for diabetic complications include no peripheral neuropathy or PVD. Risk factors for coronary artery disease include diabetes mellitus, obesity and male sex. Current diabetic treatment includes oral agent (triple therapy). His weight is stable. He is following a diabetic diet. When asked about meal planning, he reported none. He rarely participates in exercise.  "(Patient does not check sugar regularly at home) An ACE inhibitor/angiotensin II receptor blocker is not being taken.   Hypertension  This is a chronic problem. The current episode started more than 1 year ago. The problem has been stable since onset. The problem is controlled. Pertinent negatives include no blurred vision, chest pain, headaches, malaise/fatigue, neck pain, palpitations or sweats. There are no associated agents to hypertension. Risk factors for coronary artery disease include male gender, obesity, diabetes mellitus and dyslipidemia. Past treatments include nothing. Current antihypertension treatment includes angiotensin blockers. The current treatment provides mild improvement. There are no compliance problems.  There is no history of heart failure, left ventricular hypertrophy or PVD.       Review of Systems   Constitutional:  Negative for fatigue and malaise/fatigue.   Eyes:  Negative for blurred vision.   Cardiovascular:  Negative for chest pain and palpitations.   Gastrointestinal:  Negative for abdominal pain, bowel incontinence, nausea and vomiting.   Endocrine: Negative for cold intolerance, heat intolerance, polydipsia and polyuria.   Genitourinary:  Negative for dysuria, frequency, pelvic pain and urinary incontinence.   Musculoskeletal:  Positive for back pain. Negative for gait problem, joint swelling and neck pain.   Skin:  Negative for rash.   Neurological:  Negative for dizziness, weakness and paresthesias.   Hematological:  Negative for adenopathy. Does not bruise/bleed easily.        Objective   Vital Signs:   /84 (BP Location: Right arm, Patient Position: Sitting, Cuff Size: Adult)   Pulse 95   Temp 98 °F (36.7 °C) (Temporal)   Resp 18   Ht 167.6 cm (66\")   Wt 101 kg (223 lb)   SpO2 96%   BMI 35.99 kg/m²     Physical Exam  Constitutional:       General: He is not in acute distress.  Cardiovascular:      Rate and Rhythm: Normal rate.      Heart sounds: No murmur " heard.  Pulmonary:      Effort: Pulmonary effort is normal.      Breath sounds: Normal breath sounds. No wheezing.   Abdominal:      General: Abdomen is flat. Bowel sounds are normal. There is no distension.      Palpations: Abdomen is soft. There is no mass.      Tenderness: There is no abdominal tenderness. There is no right CVA tenderness, left CVA tenderness or rebound.      Hernia: No hernia is present.   Musculoskeletal:      Cervical back: Neck supple.      Lumbar back: No tenderness or bony tenderness. Decreased range of motion. Positive right straight leg raise test and positive left straight leg raise test.      Right lower leg: No edema.      Left lower leg: No edema.      Comments: He has difficulty sitting, prefers to stand.Can stand on heels and toes.    Lymphadenopathy:      Cervical: No cervical adenopathy.   Skin:     Findings: No rash.   Neurological:      Mental Status: He is alert.        Result Review :Labs  Result Review  Imaging  Med Tab  Media                 Assessment and Plan CC Problem List  Visit Diagnosis  ROS  Review (Popup)  Health Maintenance  Quality  BestPractice  Medications  SmartSets  SnapShot Encounters  Media  Problem List Items Addressed This Visit          Unprioritized    Essential hypertension    Type 2 diabetes mellitus without complication, without long-term current use of insulin - Primary    Overview     A1c 8.1 12/17/2024 increase Ozempic to 1 mg, using steroids low dose for back.           Relevant Medications    Semaglutide,0.25 or 0.5MG/DOS, (Ozempic, 0.25 or 0.5 MG/DOSE,) 2 MG/1.5ML solution pen-injector    Other Relevant Orders    POC Glycosylated Hemoglobin (Hb A1C) (Completed)    Class 2 severe obesity due to excess calories with serious comorbidity and body mass index (BMI) of 36.0 to 36.9 in adult    Current Assessment & Plan     Patient's (Body mass index is 35.99 kg/m².) indicates that they are obese (BMI >30) with health conditions that  include hypertension, impaired fasting glucose, and dyslipidemias . Weight is improving with lifestyle modifications. BMI  is above average; BMI management plan is completed. We discussed portion control and increasing exercise.           Other Visit Diagnoses       Acute midline low back pain with left-sided sciatica        significant, neg xray 2022, ice heat ROM systemic steroids at low dose given poorly controlled. DM. no work until after 12/20 f.u 12/19 for work release.    Relevant Medications    predniSONE (DELTASONE) 10 MG tablet    HYDROcodone-acetaminophen (NORCO) 5-325 MG per tablet    tiZANidine (ZANAFLEX) 4 MG tablet            Follow Up Instructions Charge Capture  Follow-up Communications  Return in about 2 years (around 12/17/2026), or if symptoms worsen or fail to improve.  Patient was given instructions and counseling regarding his condition or for health maintenance advice. Please see specific information pulled into the AVS if appropriate.

## 2024-12-17 ENCOUNTER — OFFICE VISIT (OUTPATIENT)
Dept: FAMILY MEDICINE CLINIC | Facility: CLINIC | Age: 54
End: 2024-12-17
Payer: COMMERCIAL

## 2024-12-17 VITALS
BODY MASS INDEX: 35.84 KG/M2 | TEMPERATURE: 98 F | DIASTOLIC BLOOD PRESSURE: 84 MMHG | SYSTOLIC BLOOD PRESSURE: 138 MMHG | WEIGHT: 223 LBS | HEART RATE: 95 BPM | HEIGHT: 66 IN | RESPIRATION RATE: 18 BRPM | OXYGEN SATURATION: 96 %

## 2024-12-17 DIAGNOSIS — E11.9 TYPE 2 DIABETES MELLITUS WITHOUT COMPLICATION, WITHOUT LONG-TERM CURRENT USE OF INSULIN: Primary | ICD-10-CM

## 2024-12-17 DIAGNOSIS — M54.42 ACUTE MIDLINE LOW BACK PAIN WITH LEFT-SIDED SCIATICA: ICD-10-CM

## 2024-12-17 DIAGNOSIS — E66.01 CLASS 2 SEVERE OBESITY DUE TO EXCESS CALORIES WITH SERIOUS COMORBIDITY AND BODY MASS INDEX (BMI) OF 36.0 TO 36.9 IN ADULT: ICD-10-CM

## 2024-12-17 DIAGNOSIS — E66.812 CLASS 2 SEVERE OBESITY DUE TO EXCESS CALORIES WITH SERIOUS COMORBIDITY AND BODY MASS INDEX (BMI) OF 36.0 TO 36.9 IN ADULT: ICD-10-CM

## 2024-12-17 DIAGNOSIS — I10 ESSENTIAL HYPERTENSION: ICD-10-CM

## 2024-12-17 LAB
EXPIRATION DATE: ABNORMAL
HBA1C MFR BLD: 8.1 % (ref 4.5–5.7)
Lab: ABNORMAL

## 2024-12-17 PROCEDURE — 99214 OFFICE O/P EST MOD 30 MIN: CPT | Performed by: FAMILY MEDICINE

## 2024-12-17 PROCEDURE — 83036 HEMOGLOBIN GLYCOSYLATED A1C: CPT | Performed by: FAMILY MEDICINE

## 2024-12-17 RX ORDER — HYDROCODONE BITARTRATE AND ACETAMINOPHEN 5; 325 MG/1; MG/1
1 TABLET ORAL EVERY 6 HOURS PRN
Qty: 30 TABLET | Refills: 0 | Status: SHIPPED | OUTPATIENT
Start: 2024-12-17

## 2024-12-17 RX ORDER — PREDNISONE 10 MG/1
10 TABLET ORAL TAKE AS DIRECTED
Qty: 16 TABLET | Refills: 0 | Status: SHIPPED | OUTPATIENT
Start: 2024-12-17 | End: 2024-12-27

## 2024-12-17 RX ORDER — SEMAGLUTIDE 1.34 MG/ML
1 INJECTION, SOLUTION SUBCUTANEOUS WEEKLY
Qty: 3 ML | Refills: 5 | Status: SHIPPED | OUTPATIENT
Start: 2024-12-17

## 2024-12-17 NOTE — ASSESSMENT & PLAN NOTE
Patient's (Body mass index is 35.99 kg/m².) indicates that they are obese (BMI >30) with health conditions that include hypertension, impaired fasting glucose, and dyslipidemias . Weight is improving with lifestyle modifications. BMI  is above average; BMI management plan is completed. We discussed portion control and increasing exercise.

## 2024-12-17 NOTE — LETTER
December 17, 2024     Patient: Bob Rebollar   YOB: 1970   Date of Visit: 12/17/2024       To Whom It May Concern:    It is my medical opinion that Bob Rebollar has a medical problem which makes it impossible for him to work. 12/16,12/17,12/18, 12/19 and 12/20/2024. He will follow up here 12/20/2024         Sincerely,        Karrie Patino MD    CC: No Recipients

## 2024-12-19 ENCOUNTER — OFFICE VISIT (OUTPATIENT)
Dept: FAMILY MEDICINE CLINIC | Facility: CLINIC | Age: 54
End: 2024-12-19
Payer: COMMERCIAL

## 2024-12-19 VITALS
OXYGEN SATURATION: 98 % | DIASTOLIC BLOOD PRESSURE: 90 MMHG | BODY MASS INDEX: 35.9 KG/M2 | SYSTOLIC BLOOD PRESSURE: 140 MMHG | TEMPERATURE: 97.3 F | WEIGHT: 223.4 LBS | HEIGHT: 66 IN | HEART RATE: 81 BPM | RESPIRATION RATE: 18 BRPM

## 2024-12-19 DIAGNOSIS — M54.50 ACUTE BILATERAL LOW BACK PAIN WITHOUT SCIATICA: ICD-10-CM

## 2024-12-19 DIAGNOSIS — E78.2 MIXED HYPERLIPIDEMIA: ICD-10-CM

## 2024-12-19 DIAGNOSIS — I10 ESSENTIAL HYPERTENSION: Primary | ICD-10-CM

## 2024-12-19 DIAGNOSIS — E66.812 CLASS 2 SEVERE OBESITY DUE TO EXCESS CALORIES WITH SERIOUS COMORBIDITY AND BODY MASS INDEX (BMI) OF 36.0 TO 36.9 IN ADULT: ICD-10-CM

## 2024-12-19 DIAGNOSIS — E66.01 CLASS 2 SEVERE OBESITY DUE TO EXCESS CALORIES WITH SERIOUS COMORBIDITY AND BODY MASS INDEX (BMI) OF 36.0 TO 36.9 IN ADULT: ICD-10-CM

## 2024-12-19 DIAGNOSIS — E11.65 TYPE 2 DIABETES MELLITUS WITH HYPERGLYCEMIA, WITHOUT LONG-TERM CURRENT USE OF INSULIN: ICD-10-CM

## 2024-12-19 PROCEDURE — 99214 OFFICE O/P EST MOD 30 MIN: CPT | Performed by: FAMILY MEDICINE

## 2024-12-19 NOTE — PROGRESS NOTES
Subjective   Bob Rebollar is a 54 y.o. male. Presents to [facility]    Chief Complaint   Patient presents with    Back Pain    Hypertension    Diabetes       Back Pain  This is a recurrent problem. The current episode started more than 1 month ago. The problem occurs intermittently. The problem has been improved since onset. The pain is present in the lumbar spine. The quality of the pain is described as burning and aching. The pain radiates to the left thigh and left buttock. The pain is at a severity of 4/10. The pain is mild. The pain is The same all the time. The symptoms are aggravated by bending and position. Pertinent negatives include no abdominal pain, chest pain, dysuria or numbness. Treatments tried: prednisone. The treatment provided moderate relief.   Hypertension  Pertinent negatives include no chest pain.   Diabetes  Pertinent negatives for diabetes include no chest pain.      A1C on  was 8.1    He has been placed on steroids    Blood sugar was 115 this AM. After eating 130    He has not started his semaglutide 1mg. He restarted his 0.25mg    I personally reviewed and updated the patient's allergies, medications, problem list, and past medical, surgical, social, and family history. I have reviewed and confirmed the accuracy of the History of Present Illness and Review of Symptoms as documented by the MA/LPN/RN. Anisha Carmona MD    Allergies:  No Known Allergies    Social History:  Social History     Socioeconomic History    Marital status:    Tobacco Use    Smoking status: Former     Current packs/day: 0.00     Average packs/day: 1 pack/day for 8.0 years (8.0 ttl pk-yrs)     Types: Cigarettes     Start date:      Quit date:      Years since quittin.0    Smokeless tobacco: Never   Vaping Use    Vaping status: Never Used   Substance and Sexual Activity    Alcohol use: No    Drug use: Never    Sexual activity: Defer       Family History:  Family History    Problem Relation Age of Onset    Diabetes Mother         diabetes mellitus    Heart disease Mother         cardiovascular disease, ischemic    Diabetes Brother         diabetes mellitus       Past Medical History :  Patient Active Problem List   Diagnosis    Allergic rhinitis    History of tobacco use    Polyarthropathy or polyarthritis    Thoracic disc herniation    Essential hypertension    SI (sacroiliac) joint dysfunction    Gastroesophageal reflux disease without esophagitis    Acute pain of right knee    Polyphagia    Type 2 diabetes mellitus with hyperglycemia, without long-term current use of insulin    Annual visit for general adult medical examination with abnormal findings    Screening PSA (prostate specific antigen)    Mixed hyperlipidemia    Myopia    Regular astigmatism    Presbyopia    Class 2 severe obesity due to excess calories with serious comorbidity and body mass index (BMI) of 36.0 to 36.9 in adult    Idiopathic chronic gout of multiple sites without tophus    Screening for colorectal cancer    LAD (lymphadenopathy), anterior cervical    Right foot pain    Reactive airway disease    Acute bilateral low back pain without sciatica       Medication List:    Current Outpatient Medications:     Accu-Chek FastClix Lancets misc, 1 each by Other route Daily., Disp: 100 each, Rfl: 6    acetaminophen (TYLENOL) 500 MG tablet, Take 1 tablet by mouth Every 6 (Six) Hours As Needed for Mild Pain., Disp: , Rfl:     albuterol (PROVENTIL) (2.5 MG/3ML) 0.083% nebulizer solution, Take 2.5 mg by nebulization Every 4 (Four) Hours As Needed for Wheezing., Disp: 90 each, Rfl: 0    albuterol sulfate  (90 Base) MCG/ACT inhaler, Inhale 2 puffs Every 4 (Four) Hours As Needed for Wheezing., Disp: 6.7 g, Rfl: 6    azelastine (ASTELIN) 0.1 % nasal spray, 2 sprays into the nostril(s) as directed by provider 2 (Two) Times a Day. Use in each nostril as directed, Disp: 30 mL, Rfl: 0    Blood Glucose Monitoring Suppl  (Accu-Chek Guide) w/Device kit, 1 each Daily., Disp: 1 kit, Rfl: 0    calcium carbonate (TUMS) 500 MG chewable tablet, Chew 1 tablet As Needed for Indigestion or Heartburn., Disp: , Rfl:     colchicine 0.6 MG tablet, TAKE 1 TABLET BY MOUTH TWICE DAILY AS NEEDED FOR GOUT FLARE, Disp: 90 tablet, Rfl: 0    empagliflozin (Jardiance) 25 MG tablet tablet, Take 1 tablet by mouth Daily., Disp: 365 tablet, Rfl: 0    fluticasone (FLONASE) 50 MCG/ACT nasal spray, 2 sprays into the nostril(s) as directed by provider Daily., Disp: 16 g, Rfl: 0    glucose blood (Accu-Chek Guide) test strip, 1 each by Other route Daily. Use as instructed, Disp: 50 each, Rfl: 12    HYDROcodone-acetaminophen (NORCO) 5-325 MG per tablet, Take 1 tablet by mouth Every 6 (Six) Hours As Needed for Moderate Pain., Disp: 30 tablet, Rfl: 0    ibuprofen (ADVIL,MOTRIN) 200 MG tablet, Take 1 tablet by mouth Every 6 (Six) Hours As Needed for Mild Pain., Disp: , Rfl:     ipratropium (ATROVENT) 0.02 % nebulizer solution, Take 2.5 mL by nebulization Every 8 (Eight) Hours., Disp: 100 mL, Rfl: 12    losartan (COZAAR) 25 MG tablet, Take 1 tablet by mouth Daily., Disp: 30 tablet, Rfl: 12    metFORMIN ER (GLUCOPHAGE-XR) 500 MG 24 hr tablet, Take 2 tablets by mouth Daily With Breakfast., Disp: 60 tablet, Rfl: 12    rosuvastatin (CRESTOR) 20 MG tablet, TAKE 1 TABLET BY MOUTH DAILY, Disp: 90 tablet, Rfl: 3    Semaglutide,0.25 or 0.5MG/DOS, (Ozempic, 0.25 or 0.5 MG/DOSE,) 2 MG/1.5ML solution pen-injector, Inject 1 mg under the skin into the appropriate area as directed 1 (One) Time Per Week., Disp: 3 mL, Rfl: 5    tiZANidine (ZANAFLEX) 4 MG tablet, Take 1 tablet by mouth At Night As Needed for Muscle Spasms., Disp: 30 tablet, Rfl: 3    vitamin D3 125 MCG (5000 UT) capsule capsule, Take 1 capsule by mouth Daily., Disp: , Rfl:     Past Surgical History:  No past surgical history on file.      Physical Exam:      Vital Signs:    Vitals:    12/19/24 0815   BP: 140/90   Pulse:  "81   Resp: 18   Temp: 97.3 °F (36.3 °C)   SpO2: 98%        /90 (BP Location: Right arm, Patient Position: Sitting, Cuff Size: Adult)   Pulse 81   Temp 97.3 °F (36.3 °C) (Temporal)   Resp 18   Ht 167.6 cm (66\")   Wt 101 kg (223 lb 6.4 oz)   SpO2 98% Comment: ra  BMI 36.06 kg/m²     Wt Readings from Last 3 Encounters:   12/19/24 101 kg (223 lb 6.4 oz)   12/17/24 101 kg (223 lb)   07/26/24 102 kg (224 lb 9.6 oz)       Result Review :   The following data was reviewed by: Anisha Carmona MD on 12/19/2024:  A1C Last 3 Results          6/28/2024    08:43 12/17/2024    09:24   HGBA1C Last 3 Results   Hemoglobin A1C 9.4  8.1               Physical Exam  Vitals reviewed.   Constitutional:       Appearance: Normal appearance. He is well-developed.   HENT:      Head: Normocephalic and atraumatic.   Eyes:      General:         Right eye: No discharge.         Left eye: No discharge.   Cardiovascular:      Rate and Rhythm: Normal rate and regular rhythm.      Heart sounds: Normal heart sounds. No murmur heard.     No friction rub. No gallop.   Pulmonary:      Effort: Pulmonary effort is normal. No respiratory distress.      Breath sounds: Normal breath sounds. No wheezing or rales.   Skin:     General: Skin is warm and dry.      Findings: No rash.   Neurological:      Mental Status: He is alert and oriented to person, place, and time.      Coordination: Coordination normal.      Gait: Gait normal.   Psychiatric:         Behavior: Behavior is cooperative.         Assessment and Plan:  Problems Addressed this Visit          Cardiac and Vasculature    Essential hypertension - Primary     Hypertension is borderline  Continue current treatment regimen.  Dietary sodium restriction.  Weight loss.  Ambulatory blood pressure monitoring.  Blood pressure will be reassessed 2 weeks         Mixed hyperlipidemia       He is on crestor  Will check labs  Continue current treatment            Endocrine and Metabolic    Type 2 " diabetes mellitus with hyperglycemia, without long-term current use of insulin     Not at goal. A1C did go down a little but he has been not taking his medication and not following diet changes    Discussed risk and morbidity and mortality of uncontrolled diabetes         Class 2 severe obesity due to excess calories with serious comorbidity and body mass index (BMI) of 36.0 to 36.9 in adult     Patient's (Body mass index is 36.06 kg/m².) indicates that they are morbidly/severely obese (BMI > 40 or > 35 with obesity - related health condition) with health conditions that include hypertension, diabetes mellitus, and dyslipidemias . Weight is unchanged. BMI  is above average; BMI management plan is completed. We discussed portion control and increasing exercise.             Musculoskeletal and Injuries    Acute bilateral low back pain without sciatica     Ice three times a day for about 10-15 minutes for the first 1-2 days. Then may alternate heat and ice. Better body mechanics discussed. Home exercises discussed and hand out given. Discussed nsaids and if they can be taken. May need imaging and or PT if persists.  Discussed red flags, if there is severe pain, fever with pain, loss of movement in one or both legs pain, numbness in groin or both legs, trouble urinating or defecating on oneself, then patient is to go to the ER.             Diagnoses         Codes Comments    Essential hypertension    -  Primary ICD-10-CM: I10  ICD-9-CM: 401.9     Mixed hyperlipidemia     ICD-10-CM: E78.2  ICD-9-CM: 272.2     Acute bilateral low back pain without sciatica     ICD-10-CM: M54.50  ICD-9-CM: 724.2, 338.19     Class 2 severe obesity due to excess calories with serious comorbidity and body mass index (BMI) of 36.0 to 36.9 in adult     ICD-10-CM: E66.812, E66.01, Z68.36  ICD-9-CM: 278.01, V85.36     Type 2 diabetes mellitus with hyperglycemia, without long-term current use of insulin     ICD-10-CM: E11.65  ICD-9-CM: 250.00,  790.29                          An After Visit Summary and PPPS were given to the patient.       This document is intended for medical expert use only. Reading of this document by patients and/or patient's family without participating medical staff guidance may result in misinterpretation and unintended morbidity. Any interpretation of such data is the responsibility of the patient and/or family member responsible for the patient in concert with their primary or specialist providers, not to be left for sources of online searches such as [employer], [employer] or similar queries. Relying on these approaches to knowledge may result in misinterpretation, misguided goals of care and even death should patients or family members try recommendations outside of the realm of professional medical care.

## 2024-12-19 NOTE — ASSESSMENT & PLAN NOTE
Not at goal. A1C did go down a little but he has been not taking his medication and not following diet changes    Discussed risk and morbidity and mortality of uncontrolled diabetes

## 2024-12-20 ENCOUNTER — CLINICAL SUPPORT (OUTPATIENT)
Dept: FAMILY MEDICINE CLINIC | Facility: CLINIC | Age: 54
End: 2024-12-20
Payer: COMMERCIAL

## 2024-12-20 DIAGNOSIS — E78.2 MIXED HYPERLIPIDEMIA: Primary | ICD-10-CM

## 2024-12-20 PROCEDURE — 36415 COLL VENOUS BLD VENIPUNCTURE: CPT | Performed by: FAMILY MEDICINE

## 2024-12-20 NOTE — PROGRESS NOTES
Site care done- cleaned with alcohol swab, procedure tolerated well, dressing applied. Venipuncture was obtained after 1 time(s). 1 tubes were drawn. Pt tolerated well

## 2024-12-21 LAB
ALBUMIN SERPL-MCNC: 4.6 G/DL (ref 3.8–4.9)
ALP SERPL-CCNC: 88 IU/L (ref 44–121)
ALT SERPL-CCNC: 25 IU/L (ref 0–44)
AST SERPL-CCNC: 23 IU/L (ref 0–40)
BILIRUB SERPL-MCNC: 0.9 MG/DL (ref 0–1.2)
BUN SERPL-MCNC: 17 MG/DL (ref 6–24)
BUN/CREAT SERPL: 18 (ref 9–20)
CALCIUM SERPL-MCNC: 9.3 MG/DL (ref 8.7–10.2)
CHLORIDE SERPL-SCNC: 103 MMOL/L (ref 96–106)
CHOLEST SERPL-MCNC: 172 MG/DL (ref 100–199)
CHOLEST/HDLC SERPL: 3.6 RATIO (ref 0–5)
CO2 SERPL-SCNC: 21 MMOL/L (ref 20–29)
CREAT SERPL-MCNC: 0.93 MG/DL (ref 0.76–1.27)
EGFRCR SERPLBLD CKD-EPI 2021: 98 ML/MIN/1.73
GLOBULIN SER CALC-MCNC: 2.3 G/DL (ref 1.5–4.5)
GLUCOSE SERPL-MCNC: 87 MG/DL (ref 70–99)
HDLC SERPL-MCNC: 48 MG/DL
LDLC SERPL CALC-MCNC: 96 MG/DL (ref 0–99)
POTASSIUM SERPL-SCNC: 4.2 MMOL/L (ref 3.5–5.2)
PROT SERPL-MCNC: 6.9 G/DL (ref 6–8.5)
SODIUM SERPL-SCNC: 141 MMOL/L (ref 134–144)
TRIGL SERPL-MCNC: 159 MG/DL (ref 0–149)
VLDLC SERPL CALC-MCNC: 28 MG/DL (ref 5–40)

## 2024-12-31 NOTE — ASSESSMENT & PLAN NOTE
Hypertension is borderline  Continue current treatment regimen.  Dietary sodium restriction.  Weight loss.  Ambulatory blood pressure monitoring.  Blood pressure will be reassessed 2 weeks

## 2024-12-31 NOTE — ASSESSMENT & PLAN NOTE
Patient's (Body mass index is 36.06 kg/m².) indicates that they are morbidly/severely obese (BMI > 40 or > 35 with obesity - related health condition) with health conditions that include hypertension, diabetes mellitus, and dyslipidemias . Weight is unchanged. BMI  is above average; BMI management plan is completed. We discussed portion control and increasing exercise.

## 2025-06-02 DIAGNOSIS — I10 ESSENTIAL HYPERTENSION: ICD-10-CM

## 2025-06-03 RX ORDER — LOSARTAN POTASSIUM 25 MG/1
25 TABLET ORAL DAILY
Qty: 30 TABLET | Refills: 12 | Status: SHIPPED | OUTPATIENT
Start: 2025-06-03

## 2025-07-19 DIAGNOSIS — E11.9 TYPE 2 DIABETES MELLITUS WITHOUT COMPLICATION, WITHOUT LONG-TERM CURRENT USE OF INSULIN: ICD-10-CM

## 2025-07-21 RX ORDER — METFORMIN HYDROCHLORIDE 500 MG/1
1000 TABLET, EXTENDED RELEASE ORAL
Qty: 60 TABLET | Refills: 0 | Status: SHIPPED | OUTPATIENT
Start: 2025-07-21

## 2025-08-15 DIAGNOSIS — E11.9 TYPE 2 DIABETES MELLITUS WITHOUT COMPLICATION, WITHOUT LONG-TERM CURRENT USE OF INSULIN: ICD-10-CM

## 2025-08-15 RX ORDER — METFORMIN HYDROCHLORIDE 500 MG/1
1000 TABLET, EXTENDED RELEASE ORAL
Qty: 30 TABLET | Refills: 0 | Status: SHIPPED | OUTPATIENT
Start: 2025-08-15